# Patient Record
Sex: FEMALE | Race: WHITE | NOT HISPANIC OR LATINO | Employment: FULL TIME | URBAN - METROPOLITAN AREA
[De-identification: names, ages, dates, MRNs, and addresses within clinical notes are randomized per-mention and may not be internally consistent; named-entity substitution may affect disease eponyms.]

---

## 2019-01-15 ENCOUNTER — OFFICE VISIT (OUTPATIENT)
Dept: FAMILY MEDICINE CLINIC | Facility: CLINIC | Age: 49
End: 2019-01-15

## 2019-01-15 VITALS
OXYGEN SATURATION: 98 % | BODY MASS INDEX: 21.22 KG/M2 | TEMPERATURE: 97.9 F | HEIGHT: 68 IN | DIASTOLIC BLOOD PRESSURE: 74 MMHG | HEART RATE: 58 BPM | WEIGHT: 140 LBS | SYSTOLIC BLOOD PRESSURE: 120 MMHG | RESPIRATION RATE: 16 BRPM

## 2019-01-15 DIAGNOSIS — R10.9 RIGHT FLANK PAIN: ICD-10-CM

## 2019-01-15 DIAGNOSIS — B02.9 HERPES ZOSTER WITHOUT COMPLICATION: Primary | ICD-10-CM

## 2019-01-15 PROCEDURE — 99214 OFFICE O/P EST MOD 30 MIN: CPT | Performed by: FAMILY MEDICINE

## 2019-01-15 RX ORDER — TRIAMCINOLONE ACETONIDE 5 MG/G
CREAM TOPICAL 3 TIMES DAILY
Qty: 30 G | Refills: 0 | Status: SHIPPED | OUTPATIENT
Start: 2019-01-15

## 2019-01-15 RX ORDER — PREDNISONE 50 MG/1
50 TABLET ORAL DAILY
Qty: 5 TABLET | Refills: 0 | Status: SHIPPED | OUTPATIENT
Start: 2019-01-15 | End: 2019-01-20

## 2019-01-15 RX ORDER — VALACYCLOVIR HYDROCHLORIDE 500 MG/1
500 TABLET, FILM COATED ORAL 2 TIMES DAILY
Qty: 20 TABLET | Refills: 0 | Status: CANCELLED | OUTPATIENT
Start: 2019-01-15 | End: 2019-01-25

## 2019-01-15 RX ORDER — VALACYCLOVIR HYDROCHLORIDE 1 G/1
1000 TABLET, FILM COATED ORAL 2 TIMES DAILY
Qty: 21 TABLET | Refills: 0 | Status: SHIPPED | OUTPATIENT
Start: 2019-01-15 | End: 2019-01-22

## 2019-01-15 NOTE — PROGRESS NOTES
Assessment/Plan:     Diagnoses and all orders for this visit:    Herpes zoster without complication  Comments: Will prescribe valtrex and prednisone  Continue to monitor and follow up with any new or worsening symptoms  Orders:  -     predniSONE 50 mg tablet; Take 1 tablet (50 mg total) by mouth daily for 5 days  -     valACYclovir (VALTREX) 1,000 mg tablet; Take 1 tablet (1,000 mg total) by mouth 2 (two) times a day for 7 days  -     triamcinolone (KENALOG) 0 5 % cream; Apply topically 3 (three) times a day    Right flank pain  Comments:  She was told most probably secondary to shingles  Was given a prednisone and was told to take Tylenol  Back in 1 month I will consider Neurontin if it continue    Other orders  -     Cancel: valACYclovir (VALTREX) 500 mg tablet; Take 1 tablet (500 mg total) by mouth 2 (two) times a day for 10 days          There are no Patient Instructions on file for this visit  Return in about 1 month (around 2/15/2019) for Annual physical     Subjective:      Patient ID: Yun Bolaños is a 53 y o  female  Chief Complaint   Patient presents with    Rash     on waist       HPI    Patient presents with CC of a rash x two weeks on the right side of her navel  She admits to some fatigue and headaches as well  She states the rash was painful but has been getting better  Denies fever, chills, SOB, chest pain, GI symptoms  The following portions of the patient's history were reviewed and updated as appropriate: allergies, current medications, past family history, past medical history, past social history, past surgical history and problem list     Review of Systems   Constitutional: Positive for fatigue  Negative for chills and fever  HENT: Negative for trouble swallowing  Eyes: Negative for visual disturbance  Respiratory: Negative for cough and shortness of breath  Cardiovascular: Negative for chest pain, palpitations and leg swelling     Gastrointestinal: Negative for abdominal pain, constipation and diarrhea  Endocrine: Negative for cold intolerance and heat intolerance  Genitourinary: Negative for difficulty urinating and dysuria  Musculoskeletal: Negative for gait problem  Skin: Positive for rash  Neurological: Positive for headaches  Negative for dizziness, tremors and seizures  Hematological: Negative for adenopathy  Psychiatric/Behavioral: Negative for behavioral problems  Current Outpatient Prescriptions   Medication Sig Dispense Refill    predniSONE 50 mg tablet Take 1 tablet (50 mg total) by mouth daily for 5 days 5 tablet 0    triamcinolone (KENALOG) 0 5 % cream Apply topically 3 (three) times a day 30 g 0    valACYclovir (VALTREX) 1,000 mg tablet Take 1 tablet (1,000 mg total) by mouth 2 (two) times a day for 7 days 21 tablet 0     No current facility-administered medications for this visit  Objective:    /74 (BP Location: Left arm, Patient Position: Sitting, Cuff Size: Standard)   Pulse 58   Temp 97 9 °F (36 6 °C) (Tympanic)   Resp 16   Ht 5' 7 5" (1 715 m)   Wt 63 5 kg (140 lb)   SpO2 98%   BMI 21 60 kg/m²        Physical Exam   Constitutional: She is oriented to person, place, and time  She appears well-developed and well-nourished  HENT:   Head: Normocephalic and atraumatic  Eyes: Pupils are equal, round, and reactive to light  EOM are normal    Neck: Normal range of motion  Neck supple  Cardiovascular: Normal rate, regular rhythm and normal heart sounds  Pulmonary/Chest: Effort normal and breath sounds normal    Abdominal: Soft  Bowel sounds are normal    Musculoskeletal: Normal range of motion  She exhibits no edema  Lymphadenopathy:     She has no cervical adenopathy  Neurological: She is alert and oriented to person, place, and time  No cranial nerve deficit  Skin: Skin is warm  Rash noted  Psychiatric: She has a normal mood and affect  Nursing note and vitals reviewed               Wassim Meme Osuna MD

## 2019-02-11 ENCOUNTER — TRANSCRIBE ORDERS (OUTPATIENT)
Dept: ADMINISTRATIVE | Facility: HOSPITAL | Age: 49
End: 2019-02-11

## 2019-02-11 ENCOUNTER — TELEPHONE (OUTPATIENT)
Dept: FAMILY MEDICINE CLINIC | Facility: CLINIC | Age: 49
End: 2019-02-11

## 2019-02-11 DIAGNOSIS — R53.83 FATIGUE, UNSPECIFIED TYPE: ICD-10-CM

## 2019-02-11 DIAGNOSIS — R51.9 GENERALIZED HEADACHES: ICD-10-CM

## 2019-02-11 DIAGNOSIS — E55.9 VITAMIN D INSUFFICIENCY: ICD-10-CM

## 2019-02-11 DIAGNOSIS — E78.49 OTHER HYPERLIPIDEMIA: Primary | ICD-10-CM

## 2019-02-11 DIAGNOSIS — R10.9 RIGHT FLANK PAIN: ICD-10-CM

## 2019-02-11 DIAGNOSIS — B02.9 HERPES ZOSTER WITHOUT COMPLICATION: Primary | ICD-10-CM

## 2019-02-12 ENCOUNTER — APPOINTMENT (OUTPATIENT)
Dept: LAB | Facility: IMAGING CENTER | Age: 49
End: 2019-02-12

## 2019-02-12 ENCOUNTER — APPOINTMENT (OUTPATIENT)
Dept: LAB | Facility: IMAGING CENTER | Age: 49
End: 2019-02-12
Payer: COMMERCIAL

## 2019-02-12 DIAGNOSIS — R53.83 FATIGUE, UNSPECIFIED TYPE: ICD-10-CM

## 2019-02-12 DIAGNOSIS — B02.9 HERPES ZOSTER WITHOUT COMPLICATION: ICD-10-CM

## 2019-02-12 DIAGNOSIS — R51.9 GENERALIZED HEADACHES: ICD-10-CM

## 2019-02-12 DIAGNOSIS — E78.49 OTHER HYPERLIPIDEMIA: ICD-10-CM

## 2019-02-12 DIAGNOSIS — E55.9 VITAMIN D INSUFFICIENCY: ICD-10-CM

## 2019-02-12 DIAGNOSIS — R10.9 RIGHT FLANK PAIN: ICD-10-CM

## 2019-02-12 LAB
25(OH)D3 SERPL-MCNC: 34.2 NG/ML (ref 30–100)
ALBUMIN SERPL BCP-MCNC: 4.3 G/DL (ref 3.5–5)
ALP SERPL-CCNC: 65 U/L (ref 46–116)
ALT SERPL W P-5'-P-CCNC: 19 U/L (ref 12–78)
ANION GAP SERPL CALCULATED.3IONS-SCNC: 4 MMOL/L (ref 4–13)
AST SERPL W P-5'-P-CCNC: 20 U/L (ref 5–45)
BASOPHILS # BLD AUTO: 0.03 THOUSANDS/ΜL (ref 0–0.1)
BASOPHILS NFR BLD AUTO: 1 % (ref 0–1)
BILIRUB SERPL-MCNC: 0.78 MG/DL (ref 0.2–1)
BUN SERPL-MCNC: 15 MG/DL (ref 5–25)
CALCIUM SERPL-MCNC: 8.9 MG/DL (ref 8.3–10.1)
CHLORIDE SERPL-SCNC: 103 MMOL/L (ref 100–108)
CHOLEST SERPL-MCNC: 190 MG/DL (ref 50–200)
CO2 SERPL-SCNC: 29 MMOL/L (ref 21–32)
CREAT SERPL-MCNC: 0.93 MG/DL (ref 0.6–1.3)
EOSINOPHIL # BLD AUTO: 0.26 THOUSAND/ΜL (ref 0–0.61)
EOSINOPHIL NFR BLD AUTO: 6 % (ref 0–6)
ERYTHROCYTE [DISTWIDTH] IN BLOOD BY AUTOMATED COUNT: 13.2 % (ref 11.6–15.1)
GFR SERPL CREATININE-BSD FRML MDRD: 73 ML/MIN/1.73SQ M
GLUCOSE P FAST SERPL-MCNC: 78 MG/DL (ref 65–99)
HCT VFR BLD AUTO: 44.4 % (ref 34.8–46.1)
HDLC SERPL-MCNC: 98 MG/DL (ref 40–60)
HGB BLD-MCNC: 14.6 G/DL (ref 11.5–15.4)
IMM GRANULOCYTES # BLD AUTO: 0.01 THOUSAND/UL (ref 0–0.2)
IMM GRANULOCYTES NFR BLD AUTO: 0 % (ref 0–2)
LDLC SERPL CALC-MCNC: 83 MG/DL (ref 0–100)
LYMPHOCYTES # BLD AUTO: 1.5 THOUSANDS/ΜL (ref 0.6–4.47)
LYMPHOCYTES NFR BLD AUTO: 34 % (ref 14–44)
MCH RBC QN AUTO: 31.7 PG (ref 26.8–34.3)
MCHC RBC AUTO-ENTMCNC: 32.9 G/DL (ref 31.4–37.4)
MCV RBC AUTO: 97 FL (ref 82–98)
MONOCYTES # BLD AUTO: 0.34 THOUSAND/ΜL (ref 0.17–1.22)
MONOCYTES NFR BLD AUTO: 8 % (ref 4–12)
NEUTROPHILS # BLD AUTO: 2.31 THOUSANDS/ΜL (ref 1.85–7.62)
NEUTS SEG NFR BLD AUTO: 51 % (ref 43–75)
NRBC BLD AUTO-RTO: 0 /100 WBCS
PLATELET # BLD AUTO: 317 THOUSANDS/UL (ref 149–390)
PMV BLD AUTO: 10.4 FL (ref 8.9–12.7)
POTASSIUM SERPL-SCNC: 4.2 MMOL/L (ref 3.5–5.3)
PROT SERPL-MCNC: 8.1 G/DL (ref 6.4–8.2)
RBC # BLD AUTO: 4.6 MILLION/UL (ref 3.81–5.12)
SODIUM SERPL-SCNC: 136 MMOL/L (ref 136–145)
TRIGL SERPL-MCNC: 46 MG/DL
TSH SERPL DL<=0.05 MIU/L-ACNC: 1.18 UIU/ML (ref 0.36–3.74)
WBC # BLD AUTO: 4.45 THOUSAND/UL (ref 4.31–10.16)

## 2019-02-12 PROCEDURE — 82306 VITAMIN D 25 HYDROXY: CPT

## 2019-02-12 PROCEDURE — 85025 COMPLETE CBC W/AUTO DIFF WBC: CPT

## 2019-02-12 PROCEDURE — 80061 LIPID PANEL: CPT

## 2019-02-12 PROCEDURE — 36415 COLL VENOUS BLD VENIPUNCTURE: CPT

## 2019-02-12 PROCEDURE — 84443 ASSAY THYROID STIM HORMONE: CPT

## 2019-02-12 PROCEDURE — 80053 COMPREHEN METABOLIC PANEL: CPT

## 2019-02-14 ENCOUNTER — OFFICE VISIT (OUTPATIENT)
Dept: FAMILY MEDICINE CLINIC | Facility: CLINIC | Age: 49
End: 2019-02-14

## 2019-02-14 VITALS
SYSTOLIC BLOOD PRESSURE: 108 MMHG | OXYGEN SATURATION: 99 % | RESPIRATION RATE: 16 BRPM | HEIGHT: 68 IN | BODY MASS INDEX: 21.37 KG/M2 | WEIGHT: 141 LBS | HEART RATE: 57 BPM | DIASTOLIC BLOOD PRESSURE: 68 MMHG | TEMPERATURE: 98 F

## 2019-02-14 DIAGNOSIS — Z23 ENCOUNTER FOR IMMUNIZATION: ICD-10-CM

## 2019-02-14 DIAGNOSIS — Z00.00 HEALTHCARE MAINTENANCE: Primary | ICD-10-CM

## 2019-02-14 PROCEDURE — 99396 PREV VISIT EST AGE 40-64: CPT | Performed by: FAMILY MEDICINE

## 2019-02-14 PROCEDURE — 90471 IMMUNIZATION ADMIN: CPT

## 2019-02-14 PROCEDURE — 90715 TDAP VACCINE 7 YRS/> IM: CPT

## 2019-02-14 NOTE — PROGRESS NOTES
Assessment/Plan:     Diagnoses and all orders for this visit:    Healthcare maintenance  Comments: It was discussed about immunizations, diet, exercise and safety measures  Encounter for immunization  -     TDAP VACCINE GREATER THAN OR EQUAL TO 6YO IM          There are no Patient Instructions on file for this visit  Return in about 1 year (around 2/14/2020)  Subjective:      Patient ID: Osvaldo Andrews is a 53 y o  female  Chief Complaint   Patient presents with    Physical Exam       Has taken recent acyclovir as directed, without complications  Both rash and burning sensation has gone away  Regularly exercises, and states her diet is well balanced  States her last GYN visit was 5 years ago, and will have appointment soon  No significant pap findings in her hx  Denies: chest pain, sob, N/V/D, abdominal, GI and     Positive: occasional bloating, some associated diarrhea  Period: "monthly" irregular sometimes heavy sometimes light  States it fluctuates with her exercise routine  The following portions of the patient's history were reviewed and updated as appropriate: allergies, current medications, past family history, past medical history, past social history, past surgical history and problem list     Review of Systems   Constitutional: Negative for chills and fever  HENT: Negative for trouble swallowing  Eyes: Negative for visual disturbance  Respiratory: Negative for cough and shortness of breath  Cardiovascular: Negative for chest pain, palpitations and leg swelling  Gastrointestinal: Negative for abdominal pain, constipation and diarrhea  Endocrine: Negative for cold intolerance and heat intolerance  Genitourinary: Negative for difficulty urinating and dysuria  Musculoskeletal: Negative for gait problem  Skin: Negative for rash  Neurological: Negative for dizziness, tremors, seizures and headaches  Hematological: Negative for adenopathy  Psychiatric/Behavioral: Negative for behavioral problems  Current Outpatient Medications   Medication Sig Dispense Refill    triamcinolone (KENALOG) 0 5 % cream Apply topically 3 (three) times a day (Patient not taking: Reported on 2/14/2019) 30 g 0    valACYclovir (VALTREX) 1,000 mg tablet Take 1 tablet (1,000 mg total) by mouth 2 (two) times a day for 7 days 21 tablet 0     No current facility-administered medications for this visit  Objective:    /68 (BP Location: Left arm, Patient Position: Sitting, Cuff Size: Standard)   Pulse 57   Temp 98 °F (36 7 °C) (Tympanic)   Resp 16   Ht 5' 7 5" (1 715 m)   Wt 64 kg (141 lb)   SpO2 99%   BMI 21 76 kg/m²        Physical Exam   Constitutional: She is oriented to person, place, and time  She appears well-developed and well-nourished  HENT:   Head: Normocephalic and atraumatic  Eyes: Pupils are equal, round, and reactive to light  EOM are normal    Neck: Normal range of motion  Neck supple  Cardiovascular: Normal rate, regular rhythm and normal heart sounds  Pulmonary/Chest: Effort normal and breath sounds normal    Abdominal: Soft  Bowel sounds are normal    Musculoskeletal: Normal range of motion  She exhibits no edema  Lymphadenopathy:     She has no cervical adenopathy  Neurological: She is alert and oriented to person, place, and time  No cranial nerve deficit  Skin: Skin is warm  Psychiatric: She has a normal mood and affect  Nursing note and vitals reviewed               Jamia Hinds MD

## 2019-11-20 ENCOUNTER — TELEPHONE (OUTPATIENT)
Dept: FAMILY MEDICINE CLINIC | Facility: CLINIC | Age: 49
End: 2019-11-20

## 2019-11-20 NOTE — TELEPHONE ENCOUNTER
Pt called to see if she needs any vaccines because she is doing an internship at [employer] and also if flu vaccine is covered by insurance  I informed her that we are unable to know if insurance will cover vaccine but between the message left and my return call she called insurance and flu vaccine was covered  I scheduled her for flu vaccine on 11/22 and she will bring the form that needs to be completed for internship as I mentioned that when we don't have previous immunization records titers could be ordered if accepted

## 2019-11-21 ENCOUNTER — CLINICAL SUPPORT (OUTPATIENT)
Dept: FAMILY MEDICINE CLINIC | Facility: CLINIC | Age: 49
End: 2019-11-21
Payer: COMMERCIAL

## 2019-11-21 ENCOUNTER — APPOINTMENT (OUTPATIENT)
Dept: LAB | Facility: IMAGING CENTER | Age: 49
End: 2019-11-21
Payer: COMMERCIAL

## 2019-11-21 ENCOUNTER — TRANSCRIBE ORDERS (OUTPATIENT)
Dept: ADMINISTRATIVE | Facility: HOSPITAL | Age: 49
End: 2019-11-21

## 2019-11-21 DIAGNOSIS — Z23 NEED FOR INFLUENZA VACCINATION: Primary | ICD-10-CM

## 2019-11-21 DIAGNOSIS — Z13.0 SCREENING FOR BLOOD DISEASE: ICD-10-CM

## 2019-11-21 LAB
HBV SURFACE AB SER-ACNC: <3.1 MIU/ML
RUBV IGG SERPL IA-ACNC: 26.2 IU/ML

## 2019-11-21 PROCEDURE — 90471 IMMUNIZATION ADMIN: CPT

## 2019-11-21 PROCEDURE — 90686 IIV4 VACC NO PRSV 0.5 ML IM: CPT

## 2019-11-21 PROCEDURE — 86787 VARICELLA-ZOSTER ANTIBODY: CPT

## 2019-11-21 PROCEDURE — 36415 COLL VENOUS BLD VENIPUNCTURE: CPT

## 2019-11-21 PROCEDURE — 86706 HEP B SURFACE ANTIBODY: CPT

## 2019-11-21 PROCEDURE — 86762 RUBELLA ANTIBODY: CPT

## 2019-11-21 PROCEDURE — 86765 RUBEOLA ANTIBODY: CPT

## 2019-11-21 PROCEDURE — 86735 MUMPS ANTIBODY: CPT

## 2019-11-21 NOTE — PROGRESS NOTES
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

## 2019-11-25 LAB — VZV IGG SER IA-ACNC: NORMAL

## 2019-11-26 LAB
MEV IGG SER QL: NORMAL
MUV IGG SER QL: NORMAL

## 2019-11-27 ENCOUNTER — TELEPHONE (OUTPATIENT)
Dept: FAMILY MEDICINE CLINIC | Facility: CLINIC | Age: 49
End: 2019-11-27

## 2019-11-27 NOTE — TELEPHONE ENCOUNTER
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  Thank you

## 2019-11-29 NOTE — TELEPHONE ENCOUNTER
I spoke with Dr Garcia Later and she had immunity to mmr and varivax but needs to repeat hep b series   I did notify pt and she is scheduled for hep b #1 on 11/2

## 2019-12-03 ENCOUNTER — CLINICAL SUPPORT (OUTPATIENT)
Dept: FAMILY MEDICINE CLINIC | Facility: CLINIC | Age: 49
End: 2019-12-03
Payer: COMMERCIAL

## 2019-12-03 DIAGNOSIS — Z11.1 ENCOUNTER FOR PPD TEST: ICD-10-CM

## 2019-12-03 DIAGNOSIS — Z23 ENCOUNTER FOR IMMUNIZATION: Primary | ICD-10-CM

## 2019-12-03 PROCEDURE — 90746 HEPB VACCINE 3 DOSE ADULT IM: CPT

## 2019-12-03 PROCEDURE — 90471 IMMUNIZATION ADMIN: CPT

## 2019-12-03 PROCEDURE — 86580 TB INTRADERMAL TEST: CPT

## 2019-12-05 ENCOUNTER — CLINICAL SUPPORT (OUTPATIENT)
Dept: FAMILY MEDICINE CLINIC | Facility: CLINIC | Age: 49
End: 2019-12-05

## 2019-12-05 DIAGNOSIS — Z11.1 SCREENING-PULMONARY TB: Primary | ICD-10-CM

## 2019-12-05 LAB
INDURATION: 0 MM
TB SKIN TEST: NEGATIVE

## 2019-12-05 NOTE — PROGRESS NOTES
Pt here for  PPD read  Negative, 0mm (left arm)  Pt given copy on TB result, immunizations and titer results     Pt will sched 2nd TB test

## 2019-12-10 ENCOUNTER — CLINICAL SUPPORT (OUTPATIENT)
Dept: FAMILY MEDICINE CLINIC | Facility: CLINIC | Age: 49
End: 2019-12-10
Payer: COMMERCIAL

## 2019-12-10 DIAGNOSIS — Z11.1 SCREENING-PULMONARY TB: Primary | ICD-10-CM

## 2019-12-10 PROCEDURE — 86580 TB INTRADERMAL TEST: CPT

## 2019-12-12 ENCOUNTER — CLINICAL SUPPORT (OUTPATIENT)
Dept: FAMILY MEDICINE CLINIC | Facility: CLINIC | Age: 49
End: 2019-12-12

## 2019-12-12 DIAGNOSIS — Z11.1 SCREENING-PULMONARY TB: Primary | ICD-10-CM

## 2019-12-12 LAB
INDURATION: 0 MM
TB SKIN TEST: NEGATIVE

## 2020-01-06 ENCOUNTER — CLINICAL SUPPORT (OUTPATIENT)
Dept: FAMILY MEDICINE CLINIC | Facility: CLINIC | Age: 50
End: 2020-01-06
Payer: COMMERCIAL

## 2020-01-06 DIAGNOSIS — Z23 ENCOUNTER FOR IMMUNIZATION: Primary | ICD-10-CM

## 2020-01-06 PROCEDURE — 90746 HEPB VACCINE 3 DOSE ADULT IM: CPT

## 2020-01-06 PROCEDURE — 90471 IMMUNIZATION ADMIN: CPT

## 2020-03-06 ENCOUNTER — TELEPHONE (OUTPATIENT)
Dept: FAMILY MEDICINE CLINIC | Facility: CLINIC | Age: 50
End: 2020-03-06

## 2020-06-05 ENCOUNTER — CLINICAL SUPPORT (OUTPATIENT)
Dept: FAMILY MEDICINE CLINIC | Facility: CLINIC | Age: 50
End: 2020-06-05
Payer: COMMERCIAL

## 2020-06-05 DIAGNOSIS — Z23 IMMUNIZATION DUE: Primary | ICD-10-CM

## 2020-06-05 PROCEDURE — 90746 HEPB VACCINE 3 DOSE ADULT IM: CPT

## 2020-06-05 PROCEDURE — 90471 IMMUNIZATION ADMIN: CPT

## 2020-08-03 ENCOUNTER — TELEPHONE (OUTPATIENT)
Dept: FAMILY MEDICINE CLINIC | Facility: CLINIC | Age: 50
End: 2020-08-03

## 2020-08-03 DIAGNOSIS — Z13.0 SCREENING FOR BLOOD DISEASE: Primary | ICD-10-CM

## 2020-08-03 NOTE — TELEPHONE ENCOUNTER
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

## 2020-08-03 NOTE — TELEPHONE ENCOUNTER
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

## 2020-08-05 ENCOUNTER — APPOINTMENT (OUTPATIENT)
Dept: LAB | Facility: IMAGING CENTER | Age: 50
End: 2020-08-05
Payer: COMMERCIAL

## 2020-08-05 DIAGNOSIS — Z13.0 SCREENING FOR BLOOD DISEASE: ICD-10-CM

## 2020-08-05 PROCEDURE — 86706 HEP B SURFACE ANTIBODY: CPT

## 2020-08-05 PROCEDURE — 36415 COLL VENOUS BLD VENIPUNCTURE: CPT

## 2020-08-07 ENCOUNTER — TELEPHONE (OUTPATIENT)
Dept: FAMILY MEDICINE CLINIC | Facility: CLINIC | Age: 50
End: 2020-08-07

## 2020-08-07 LAB — HBV SURFACE AB SER-ACNC: >1000 MIU/ML

## 2020-08-07 NOTE — TELEPHONE ENCOUNTER
L/m for pt , returning her call  Per Dr Massiel Panchal  She has Hep B antibodies and can  a copy of lab results

## 2020-08-07 NOTE — TELEPHONE ENCOUNTER
Pt called that she needs the Hep B antibody test results and a copy  When we call her she will be bring down a form that needs to be signed by a nurse about our contact info  Please call pt with those results

## 2020-08-10 ENCOUNTER — TELEPHONE (OUTPATIENT)
Dept: FAMILY MEDICINE CLINIC | Facility: CLINIC | Age: 50
End: 2020-08-10

## 2020-08-11 ENCOUNTER — OFFICE VISIT (OUTPATIENT)
Dept: FAMILY MEDICINE CLINIC | Facility: CLINIC | Age: 50
End: 2020-08-11
Payer: COMMERCIAL

## 2020-08-11 VITALS
BODY MASS INDEX: 20.78 KG/M2 | RESPIRATION RATE: 16 BRPM | DIASTOLIC BLOOD PRESSURE: 70 MMHG | WEIGHT: 137.13 LBS | HEIGHT: 68 IN | SYSTOLIC BLOOD PRESSURE: 110 MMHG | TEMPERATURE: 98.2 F | HEART RATE: 73 BPM | OXYGEN SATURATION: 99 %

## 2020-08-11 DIAGNOSIS — Z12.31 ENCOUNTER FOR SCREENING MAMMOGRAM FOR MALIGNANT NEOPLASM OF BREAST: ICD-10-CM

## 2020-08-11 DIAGNOSIS — Z00.00 HEALTHCARE MAINTENANCE: Primary | ICD-10-CM

## 2020-08-11 PROCEDURE — 99396 PREV VISIT EST AGE 40-64: CPT | Performed by: FAMILY MEDICINE

## 2020-08-11 NOTE — PROGRESS NOTES
Assessment/Plan:  Healthcare maintenance  Was discussed about immunizations, diet, exercise and safety measures  Diagnoses and all orders for this visit:    Healthcare maintenance    Encounter for screening mammogram for malignant neoplasm of breast  -     Mammo screening bilateral w cad; Future          There are no Patient Instructions on file for this visit  Return in about 1 year (around 8/11/2021)  Subjective:      Patient ID: Ziggy Bowser is a 51 y o  female  Chief Complaint   Patient presents with    Physical Exam       She is here today for wellness exam   She brought form with her to complete  She denies any complaint today  The following portions of the patient's history were reviewed and updated as appropriate: allergies, current medications, past family history, past medical history, past social history, past surgical history and problem list     Review of Systems   Constitutional: Negative for chills and fever  HENT: Negative for trouble swallowing  Eyes: Negative for visual disturbance  Respiratory: Negative for cough and shortness of breath  Cardiovascular: Negative for chest pain, palpitations and leg swelling  Gastrointestinal: Negative for abdominal pain, constipation and diarrhea  Endocrine: Negative for cold intolerance and heat intolerance  Genitourinary: Negative for difficulty urinating and dysuria  Musculoskeletal: Negative for gait problem  Skin: Negative for rash  Neurological: Negative for dizziness, tremors, seizures and headaches  Hematological: Negative for adenopathy  Psychiatric/Behavioral: Negative for behavioral problems           Current Outpatient Medications   Medication Sig Dispense Refill    triamcinolone (KENALOG) 0 5 % cream Apply topically 3 (three) times a day (Patient not taking: Reported on 2/14/2019) 30 g 0    valACYclovir (VALTREX) 1,000 mg tablet Take 1 tablet (1,000 mg total) by mouth 2 (two) times a day for 7 days 21 tablet 0     No current facility-administered medications for this visit  Objective:    /70 (BP Location: Left arm, Patient Position: Sitting, Cuff Size: Adult)   Pulse 73   Temp 98 2 °F (36 8 °C) (Tympanic)   Resp 16   Ht 5' 7 5" (1 715 m)   Wt 62 2 kg (137 lb 2 oz)   SpO2 99%   BMI 21 16 kg/m²        Physical Exam  Vitals signs and nursing note reviewed  Constitutional:       Appearance: She is well-developed  HENT:      Head: Normocephalic and atraumatic  Eyes:      Pupils: Pupils are equal, round, and reactive to light  Neck:      Musculoskeletal: Normal range of motion and neck supple  Cardiovascular:      Rate and Rhythm: Normal rate and regular rhythm  Heart sounds: Normal heart sounds  Pulmonary:      Effort: Pulmonary effort is normal       Breath sounds: Normal breath sounds  Abdominal:      General: Bowel sounds are normal       Palpations: Abdomen is soft  Musculoskeletal: Normal range of motion  Lymphadenopathy:      Cervical: No cervical adenopathy  Skin:     General: Skin is warm  Neurological:      Mental Status: She is alert and oriented to person, place, and time  Cranial Nerves: No cranial nerve deficit                  Sunday Moss MD

## 2020-09-17 ENCOUNTER — TELEPHONE (OUTPATIENT)
Dept: FAMILY MEDICINE CLINIC | Facility: CLINIC | Age: 50
End: 2020-09-17

## 2021-04-08 DIAGNOSIS — Z23 ENCOUNTER FOR IMMUNIZATION: ICD-10-CM

## 2021-04-13 ENCOUNTER — IMMUNIZATIONS (OUTPATIENT)
Dept: FAMILY MEDICINE CLINIC | Facility: HOSPITAL | Age: 51
End: 2021-04-13

## 2021-04-13 DIAGNOSIS — Z23 ENCOUNTER FOR IMMUNIZATION: ICD-10-CM

## 2021-04-13 PROCEDURE — 0031A: CPT

## 2021-04-13 PROCEDURE — 91303: CPT

## 2021-12-14 ENCOUNTER — IMMUNIZATIONS (OUTPATIENT)
Dept: FAMILY MEDICINE CLINIC | Facility: HOSPITAL | Age: 51
End: 2021-12-14

## 2021-12-14 DIAGNOSIS — Z23 ENCOUNTER FOR IMMUNIZATION: Primary | ICD-10-CM

## 2021-12-14 PROCEDURE — 0064A COVID-19 MODERNA VACC 0.25 ML BOOSTER: CPT

## 2021-12-14 PROCEDURE — 91306 COVID-19 MODERNA VACC 0.25 ML BOOSTER: CPT

## 2022-09-12 ENCOUNTER — OFFICE VISIT (OUTPATIENT)
Dept: FAMILY MEDICINE CLINIC | Facility: CLINIC | Age: 52
End: 2022-09-12
Payer: COMMERCIAL

## 2022-09-12 VITALS
SYSTOLIC BLOOD PRESSURE: 124 MMHG | WEIGHT: 139.4 LBS | BODY MASS INDEX: 21.13 KG/M2 | HEART RATE: 48 BPM | DIASTOLIC BLOOD PRESSURE: 66 MMHG | RESPIRATION RATE: 16 BRPM | OXYGEN SATURATION: 99 % | HEIGHT: 68 IN | TEMPERATURE: 97.6 F

## 2022-09-12 DIAGNOSIS — Z00.00 HEALTHCARE MAINTENANCE: ICD-10-CM

## 2022-09-12 DIAGNOSIS — Z12.11 COLON CANCER SCREENING: Primary | ICD-10-CM

## 2022-09-12 PROCEDURE — 3725F SCREEN DEPRESSION PERFORMED: CPT | Performed by: FAMILY MEDICINE

## 2022-09-12 PROCEDURE — 99396 PREV VISIT EST AGE 40-64: CPT | Performed by: FAMILY MEDICINE

## 2022-09-12 NOTE — PROGRESS NOTES
Assessment/Plan:  Healthcare maintenance  It was discussed about immunizations, diet, exercise and safety measures  She was given referral to get her colonoscopy  Order mammogram scheduled tomorrow  Was discussed with patient she is to see her gynecologist        Diagnoses and all orders for this visit:    Colon cancer screening  -     Ambulatory Referral to General Surgery; Future  -     Ambulatory Referral to General Surgery; Future    Healthcare maintenance  -     CBC and differential; Future  -     Comprehensive metabolic panel; Future  -     Lipid Panel with Direct LDL reflex; Future  -     TSH, 3rd generation with Free T4 reflex; Future        There are no Patient Instructions on file for this visit  Return in about 1 year (around 9/12/2023)  Subjective:      Patient ID: Bello Patino is a 49 y o  female  Chief Complaint   Patient presents with    Physical Exam       Wellness exam   She denies any complaint  She did not get her colonoscopy yet  Not been seen by her gynecologist for a while  The following portions of the patient's history were reviewed and updated as appropriate: allergies, current medications, past family history, past medical history, past social history, past surgical history and problem list     Review of Systems   Constitutional: Negative for chills and fever  HENT: Negative for trouble swallowing  Eyes: Negative for visual disturbance  Respiratory: Negative for cough and shortness of breath  Cardiovascular: Negative for chest pain, palpitations and leg swelling  Gastrointestinal: Negative for abdominal pain, constipation and diarrhea  Endocrine: Negative for cold intolerance and heat intolerance  Genitourinary: Negative for difficulty urinating and dysuria  Musculoskeletal: Negative for gait problem  Skin: Negative for rash  Neurological: Negative for dizziness, tremors, seizures and headaches  Hematological: Negative for adenopathy  Psychiatric/Behavioral: Negative for behavioral problems  No current outpatient medications on file  No current facility-administered medications for this visit  Objective:    /66 (BP Location: Left arm, Patient Position: Sitting, Cuff Size: Standard)   Pulse (!) 48   Temp 97 6 °F (36 4 °C) (Tympanic)   Resp 16   Ht 5' 8" (1 727 m)   Wt 63 2 kg (139 lb 6 4 oz)   SpO2 99%   BMI 21 20 kg/m²        Physical Exam  Vitals and nursing note reviewed  Constitutional:       Appearance: She is well-developed  HENT:      Head: Normocephalic and atraumatic  Eyes:      Pupils: Pupils are equal, round, and reactive to light  Cardiovascular:      Rate and Rhythm: Normal rate and regular rhythm  Heart sounds: Normal heart sounds  Pulmonary:      Effort: Pulmonary effort is normal       Breath sounds: Normal breath sounds  Abdominal:      General: Bowel sounds are normal       Palpations: Abdomen is soft  Musculoskeletal:         General: Normal range of motion  Cervical back: Normal range of motion and neck supple  Lymphadenopathy:      Cervical: No cervical adenopathy  Skin:     General: Skin is warm  Neurological:      Mental Status: She is alert and oriented to person, place, and time  Cranial Nerves: No cranial nerve deficit                  Ayaan Clifton MD

## 2022-09-12 NOTE — ASSESSMENT & PLAN NOTE
It was discussed about immunizations, diet, exercise and safety measures  She was given referral to get her colonoscopy  Order mammogram scheduled tomorrow    Was discussed with patient she is to see her gynecologist

## 2022-09-13 ENCOUNTER — HOSPITAL ENCOUNTER (OUTPATIENT)
Dept: RADIOLOGY | Facility: IMAGING CENTER | Age: 52
Discharge: HOME | End: 2022-09-13
Payer: COMMERCIAL

## 2022-09-13 VITALS — WEIGHT: 139 LBS | HEIGHT: 68 IN | BODY MASS INDEX: 21.07 KG/M2

## 2022-09-13 DIAGNOSIS — Z12.31 ENCOUNTER FOR SCREENING MAMMOGRAM FOR MALIGNANT NEOPLASM OF BREAST: ICD-10-CM

## 2022-09-13 PROCEDURE — 77063 BREAST TOMOSYNTHESIS BI: CPT

## 2022-09-13 PROCEDURE — 77067 SCR MAMMO BI INCL CAD: CPT

## 2022-10-17 ENCOUNTER — HOSPITAL ENCOUNTER (OUTPATIENT)
Dept: MAMMOGRAPHY | Facility: CLINIC | Age: 52
Discharge: HOME | End: 2022-10-17
Payer: COMMERCIAL

## 2022-10-17 ENCOUNTER — HOSPITAL ENCOUNTER (OUTPATIENT)
Dept: ULTRASOUND IMAGING | Facility: CLINIC | Age: 52
Discharge: HOME | End: 2022-10-17
Payer: COMMERCIAL

## 2022-10-17 VITALS — BODY MASS INDEX: 21.07 KG/M2 | WEIGHT: 139 LBS | HEIGHT: 68 IN

## 2022-10-17 DIAGNOSIS — R92.8 ABNORMAL SCREENING MAMMOGRAM: ICD-10-CM

## 2022-10-17 PROCEDURE — 76642 ULTRASOUND BREAST LIMITED: CPT

## 2022-10-17 PROCEDURE — 77065 DX MAMMO INCL CAD UNI: CPT

## 2022-10-17 PROCEDURE — G0279 TOMOSYNTHESIS, MAMMO: HCPCS

## 2022-11-03 ENCOUNTER — APPOINTMENT (OUTPATIENT)
Dept: LAB | Facility: IMAGING CENTER | Age: 52
End: 2022-11-03

## 2022-11-03 DIAGNOSIS — Z00.00 HEALTHCARE MAINTENANCE: ICD-10-CM

## 2022-11-03 LAB
ALBUMIN SERPL BCP-MCNC: 3.5 G/DL (ref 3.5–5)
ALP SERPL-CCNC: 83 U/L (ref 46–116)
ALT SERPL W P-5'-P-CCNC: 24 U/L (ref 12–78)
ANION GAP SERPL CALCULATED.3IONS-SCNC: 4 MMOL/L (ref 4–13)
AST SERPL W P-5'-P-CCNC: 23 U/L (ref 5–45)
BASOPHILS # BLD AUTO: 0.03 THOUSANDS/ÂΜL (ref 0–0.1)
BASOPHILS NFR BLD AUTO: 1 % (ref 0–1)
BILIRUB SERPL-MCNC: 0.59 MG/DL (ref 0.2–1)
BUN SERPL-MCNC: 16 MG/DL (ref 5–25)
CALCIUM SERPL-MCNC: 9.3 MG/DL (ref 8.3–10.1)
CHLORIDE SERPL-SCNC: 104 MMOL/L (ref 96–108)
CHOLEST SERPL-MCNC: 190 MG/DL
CO2 SERPL-SCNC: 29 MMOL/L (ref 21–32)
CREAT SERPL-MCNC: 0.9 MG/DL (ref 0.6–1.3)
EOSINOPHIL # BLD AUTO: 0.1 THOUSAND/ÂΜL (ref 0–0.61)
EOSINOPHIL NFR BLD AUTO: 3 % (ref 0–6)
ERYTHROCYTE [DISTWIDTH] IN BLOOD BY AUTOMATED COUNT: 12.9 % (ref 11.6–15.1)
GFR SERPL CREATININE-BSD FRML MDRD: 74 ML/MIN/1.73SQ M
GLUCOSE P FAST SERPL-MCNC: 86 MG/DL (ref 65–99)
HCT VFR BLD AUTO: 38.9 % (ref 34.8–46.1)
HDLC SERPL-MCNC: 97 MG/DL
HGB BLD-MCNC: 12.9 G/DL (ref 11.5–15.4)
IMM GRANULOCYTES # BLD AUTO: 0.01 THOUSAND/UL (ref 0–0.2)
IMM GRANULOCYTES NFR BLD AUTO: 0 % (ref 0–2)
LDLC SERPL CALC-MCNC: 85 MG/DL (ref 0–100)
LYMPHOCYTES # BLD AUTO: 1.08 THOUSANDS/ÂΜL (ref 0.6–4.47)
LYMPHOCYTES NFR BLD AUTO: 33 % (ref 14–44)
MCH RBC QN AUTO: 31.2 PG (ref 26.8–34.3)
MCHC RBC AUTO-ENTMCNC: 33.2 G/DL (ref 31.4–37.4)
MCV RBC AUTO: 94 FL (ref 82–98)
MONOCYTES # BLD AUTO: 0.23 THOUSAND/ÂΜL (ref 0.17–1.22)
MONOCYTES NFR BLD AUTO: 7 % (ref 4–12)
NEUTROPHILS # BLD AUTO: 1.81 THOUSANDS/ÂΜL (ref 1.85–7.62)
NEUTS SEG NFR BLD AUTO: 56 % (ref 43–75)
NRBC BLD AUTO-RTO: 0 /100 WBCS
PLATELET # BLD AUTO: 292 THOUSANDS/UL (ref 149–390)
PMV BLD AUTO: 10.3 FL (ref 8.9–12.7)
POTASSIUM SERPL-SCNC: 4 MMOL/L (ref 3.5–5.3)
PROT SERPL-MCNC: 7.4 G/DL (ref 6.4–8.4)
RBC # BLD AUTO: 4.13 MILLION/UL (ref 3.81–5.12)
SODIUM SERPL-SCNC: 137 MMOL/L (ref 135–147)
TRIGL SERPL-MCNC: 38 MG/DL
TSH SERPL DL<=0.05 MIU/L-ACNC: 1.34 UIU/ML (ref 0.45–4.5)
WBC # BLD AUTO: 3.26 THOUSAND/UL (ref 4.31–10.16)

## 2022-11-25 NOTE — PROGRESS NOTES
Assessment/Plan:   Ruben Ho is a 47 y  o female who is here for a: First Screening Colonoscopy  Plan: Colonoscopy for: Screening for Colon Cancer, Family History of Colon Cancer      Previous Colonoscopy and  Location of polyps if any:   none      Preoperative Clearance: None  Also discussed still needs gyn routine maintenance follow-up  Does not follow gyn but reports seeing midwife every couple of years  Have not had papsmear in over 20's year   ______________________________________________________    HPI:  Ruben Ho is a 47 y  o female who was referred for evaluation of    First Screening  Currently:  Symptoms include:  no abdominal pain, change in bowel habits, or black or bloody stools  Family history of colon cancer: Father and paternal grandfather had colon cancer   Father was diagnosised at 79 with stage 4 colon cancer        Anticoagulation: none    A1C: none     LABS:      Lab Results   Component Value Date    WBC 3 26 (L) 11/03/2022    HGB 12 9 11/03/2022    HCT 38 9 11/03/2022    MCV 94 11/03/2022     11/03/2022     Lab Results   Component Value Date    K 4 0 11/03/2022     11/03/2022    CO2 29 11/03/2022    BUN 16 11/03/2022    CREATININE 0 90 11/03/2022    GLUF 86 11/03/2022    CALCIUM 9 3 11/03/2022    AST 23 11/03/2022    ALT 24 11/03/2022    ALKPHOS 83 11/03/2022    EGFR 74 11/03/2022     No results found for: HGBA1C  No results found for: INR, PROTIME      No orders to display           ROS:  General ROS: negative for - chills, fatigue, fever or night sweats, weight loss  Respiratory ROS: no cough, shortness of breath, or wheezing  Cardiovascular ROS: no chest pain or dyspnea on exertion  Genito-Urinary ROS: no dysuria, trouble voiding, or hematuria  Musculoskeletal ROS: negative for - gait disturbance, joint pain or muscle pain  Neurological ROS: no TIA or stroke symptoms  GI ROS: see HPI  Skin ROS: no new rashes or lesions   Lymphatic ROS: no new adenopathy noted by pt  GYN ROS: see HPI, no new GYN history or bleeding noted  Psy ROS: no new mental or behavioral disturbances           Imaging: none  Patient Active Problem List   Diagnosis   • Herpes zoster without complication   • Right flank pain   • Healthcare maintenance   • Encounter for screening mammogram for malignant neoplasm of breast         Allergies:  Patient has no known allergies  No current outpatient medications on file  Past Medical History:   Diagnosis Date   • Herpes zoster without complication 5/27/4753       No past surgical history on file      Family History   Problem Relation Age of Onset   • Stroke Mother    • Coronary artery disease Mother    • Colon cancer Father 72   • No Known Problems Sister    • No Known Problems Sister    • No Known Problems Sister    • No Known Problems Sister    • No Known Problems Daughter    • Thyroid cancer Daughter 29   • No Known Problems Daughter    • No Known Problems Daughter    • No Known Problems Daughter    • Colon cancer Paternal Grandfather    • No Known Problems Son        Social History     Socioeconomic History   • Marital status: /Civil Union     Spouse name: Not on file   • Number of children: Not on file   • Years of education: Not on file   • Highest education level: Not on file   Occupational History   • Not on file   Tobacco Use   • Smoking status: Never   • Smokeless tobacco: Never   Vaping Use   • Vaping Use: Never used   Substance and Sexual Activity   • Alcohol use: No   • Drug use: No   • Sexual activity: Not on file   Other Topics Concern   • Not on file   Social History Narrative   • Not on file     Social Determinants of Health     Financial Resource Strain: Not on file   Food Insecurity: Not on file   Transportation Needs: Not on file   Physical Activity: Not on file   Stress: Not on file   Social Connections: Not on file   Intimate Partner Violence: Not on file   Housing Stability: Not on file       Exam: There were no vitals filed for this visit         ______________________________________________________    PHYSICAL EXAM  General Appearance:    Alert, cooperative, no distress,      Head:    Normocephalic without obvious abnormality   Eyes:    PERRL, conjunctiva/corneas clear,        Neck:   Supple, no adenopathy, no JVD   Back:     Symmetric, no spinal or CVA tenderness   Lungs:     Clear to auscultation bilaterally,    Heart:    Regular rate and rhythm, S1 and S2 normal, no murmur   Abdomen:     Non-tender in RUQ, LUQ, RLQ, LLQ, no altman's signs  No visible masses or pulsations  Extremities:   Extremities normal  No clubbing, cyanosis or edema   Psych:   Normal Affect   Neurologic:   CNII-XII intact  Strength symmetric, speech intact                 Johanna Espinal MD  Date: 11/25/2022 Time: 7:30 AM       This report has been generated by a voice recognition software system  Therefore, there may be syntax, spelling, and/or grammatical errors  Please call if you've any questions  This  encounter has been billed by a non certified Coder  Informed consent for procedure was personally discussed, reviewed, and signed by Dr Madeline Johnson  Discussion by Dr Madeline Johnson was carried out regarding risks, benefits, and alternatives with the patient  Risks include but are not limited to:  bleeding, infection, and delayed wound healing or an open wound, pulmonary embolus, leaks from bowel or bile ducts or other viscus, transfusions, death  Discussed in further detail the more common complications and their rates of occurrence   was used if necessary  Patient expressed understanding of the issues discussed and wished/consented to proceed  All questions were answered by Dr Madeline Johnson  Discussion performed between patient and the provider signing below       Signature:   Johanna Espinal MD  Date: 11/25/2022 Time: 7:30 AM Informed consent for procedure was personally discussed, reviewed, and signed by Dr Eleanor Winn  Discussion by Dr Eleanor Winn was carried out regarding risks, benefits, and alternatives with the patient  Risks include but are not limited to:  bleeding, infection, and delayed wound healing or an open wound, pulmonary embolus, leaks from bowel or bile ducts or other viscus, transfusions, death  Discussed in further detail the more common complications and their rates of occurrence   was used if necessary  Patient expressed understanding of the issues discussed and wished/consented to proceed  All questions were answered by Dr Eleanor Winn  Discussion performed between patient and the provider signing below  Signature:   Amina Whyte MD    Date: 11/25/2022 Time: 7:30 AM           Some portions of this record may have been generated with voice recognition software  There may be translation, syntax,  or grammatical errors  Occasional wrong word or "sound-a-like" substitutions may have occurred due to the inherent limitations of the voice recognition software  Read the chart carefully and recognize, using context, where substitutions may have occurred  If you have any questions, please contact the dictating provider for clarification or correction, as needed  This encounter has been coded by a non-certified coder

## 2022-11-28 ENCOUNTER — CONSULT (OUTPATIENT)
Dept: SURGERY | Facility: CLINIC | Age: 52
End: 2022-11-28

## 2022-11-28 VITALS
BODY MASS INDEX: 21.22 KG/M2 | WEIGHT: 140 LBS | HEART RATE: 60 BPM | OXYGEN SATURATION: 99 % | SYSTOLIC BLOOD PRESSURE: 120 MMHG | DIASTOLIC BLOOD PRESSURE: 76 MMHG | TEMPERATURE: 97.2 F | RESPIRATION RATE: 14 BRPM | HEIGHT: 68 IN

## 2022-11-28 DIAGNOSIS — Z12.11 ENCOUNTER FOR SCREENING COLONOSCOPY: Primary | ICD-10-CM

## 2022-12-15 NOTE — PROGRESS NOTES
Assessment/Plan:   Gela Parkinson is a 47 y  o female who is here for a: First Screening Colonoscopy  Plan: Colonoscopy for: Screening for Colon Cancer, Family History of Colon Cancer      Previous Colonoscopy and  Location of polyps if any:   none      Preoperative Clearance: None  Also discussed still needs gyn routine maintenance follow-up  Does not follow gyn but reports seeing midwife every couple of years  Have not had papsmear in over 20's year   ______________________________________________________    HPI:  Gela Parkinson is a 47 y  o female who was referred for evaluation of    First Screening  Currently:  Symptoms include:  no abdominal pain, change in bowel habits, or black or bloody stools  Family history of colon cancer: Father and paternal grandfather had colon cancer   Father was diagnosised at [address] [place] [address] with stage 4 colon cancer        Anticoagulation: none    A1C: none     LABS:      Lab Results   Component Value Date    WBC 3 26 (L) 11/03/2022    HGB 12 9 11/03/2022    HCT 38 9 11/03/2022    MCV 94 11/03/2022     11/03/2022     Lab Results   Component Value Date    K 4 0 11/03/2022     11/03/2022    CO2 29 11/03/2022    BUN 16 11/03/2022    CREATININE 0 90 11/03/2022    GLUF 86 11/03/2022    CALCIUM 9 3 11/03/2022    AST 23 11/03/2022    ALT 24 11/03/2022    ALKPHOS 83 11/03/2022    EGFR 74 11/03/2022     No results found for: HGBA1C  No results found for: INR, PROTIME      No orders to display           ROS:  General ROS: negative for - chills, fatigue, fever or night sweats, weight loss  Respiratory ROS: no cough, shortness of breath, or wheezing  Cardiovascular ROS: no chest pain or dyspnea on exertion  Genito-Urinary ROS: no dysuria, trouble voiding, or hematuria  Musculoskeletal ROS: negative for - gait disturbance, joint pain or muscle pain  Neurological ROS: no TIA or stroke symptoms  GI ROS: see HPI  Skin ROS: no new rashes or lesions   Lymphatic ROS: no new adenopathy noted by pt  GYN ROS: see HPI, no new GYN history or bleeding noted  Psy ROS: no new mental or behavioral disturbances           Imaging: none  Patient Active Problem List   Diagnosis   • Herpes zoster without complication   • Right flank pain   • Healthcare maintenance   • Encounter for screening mammogram for malignant neoplasm of breast         Allergies:  Patient has no known allergies  No current outpatient medications on file  Past Medical History:   Diagnosis Date   • Herpes zoster without complication 3/02/9823       History reviewed  No pertinent surgical history      Family History   Problem Relation Age of Onset   • Stroke Mother    • Coronary artery disease Mother    • Colon cancer Father 72   • No Known Problems Sister    • No Known Problems Sister    • No Known Problems Sister    • No Known Problems Sister    • No Known Problems Daughter    • Thyroid cancer Daughter 29   • No Known Problems Daughter    • No Known Problems Daughter    • No Known Problems Daughter    • Colon cancer Paternal Grandfather    • No Known Problems Son        Social History     Socioeconomic History   • Marital status: /Civil Union     Spouse name: None   • Number of children: None   • Years of education: None   • Highest education level: None   Occupational History   • None   Tobacco Use   • Smoking status: Former     Types: Cigarettes   • Smokeless tobacco: Never   Vaping Use   • Vaping Use: Never used   Substance and Sexual Activity   • Alcohol use: No   • Drug use: No   • Sexual activity: None   Other Topics Concern   • None   Social History Narrative   • None     Social Determinants of Health     Financial Resource Strain: Not on file   Food Insecurity: Not on file   Transportation Needs: Not on file   Physical Activity: Not on file   Stress: Not on file   Social Connections: Not on file   Intimate Partner Violence: Not on file   Housing Stability: Not on file       Exam:   Vitals: 12/20/22 0757   BP: 116/78   Pulse: (!) 49   Temp: (!) 96 5 °F (35 8 °C)           ______________________________________________________    PHYSICAL EXAM  General Appearance:    Alert, cooperative, no distress,      Head:    Normocephalic without obvious abnormality   Eyes:    PERRL, conjunctiva/corneas clear,        Neck:   Supple, no adenopathy, no JVD   Back:     Symmetric, no spinal or CVA tenderness   Lungs:     Clear to auscultation bilaterally,    Heart:    Regular rate and rhythm, S1 and S2 normal, no murmur   Abdomen:     Non-tender in RUQ, LUQ, RLQ, LLQ, no altman's signs  No visible masses or pulsations  Extremities:   Extremities normal  No clubbing, cyanosis or edema   Psych:   Normal Affect   Neurologic:   CNII-XII intact  Strength symmetric, speech intact                 Milla Garcia PA-C  Date: 12/20/2022 Time: 8:07 AM       This report has been generated by a voice recognition software system  Therefore, there may be syntax, spelling, and/or grammatical errors  Please call if you've any questions  This  encounter has been billed by a non certified Coder  Informed consent for procedure was personally discussed, reviewed, and signed by Dr Singh Guardado  Discussion by Dr Singh Guardado was carried out regarding risks, benefits, and alternatives with the patient  Risks include but are not limited to:  bleeding, infection, and delayed wound healing or an open wound, pulmonary embolus, leaks from bowel or bile ducts or other viscus, transfusions, death  Discussed in further detail the more common complications and their rates of occurrence   was used if necessary  Patient expressed understanding of the issues discussed and wished/consented to proceed  All questions were answered by Dr Singh Guardado  Discussion performed between patient and the provider signing below       Signature:   Nnamdi Solorio  Date: 12/20/2022 Time: 8:07 AM

## 2022-12-15 NOTE — H&P (VIEW-ONLY)
Assessment/Plan:   Jamal King is a 47 y  o female who is here for a: First Screening Colonoscopy  Plan: Colonoscopy for: Screening for Colon Cancer, Family History of Colon Cancer      Previous Colonoscopy and  Location of polyps if any:   none      Preoperative Clearance: None  Also discussed still needs gyn routine maintenance follow-up  Does not follow gyn but reports seeing midwife every couple of years  Have not had papsmear in over 20's year   ______________________________________________________    HPI:  Jamal King is a 47 y  o female who was referred for evaluation of    First Screening  Currently:  Symptoms include:  no abdominal pain, change in bowel habits, or black or bloody stools  Family history of colon cancer: Father and paternal grandfather had colon cancer   Father was diagnosised at 79 with stage 4 colon cancer        Anticoagulation: none    A1C: none     LABS:      Lab Results   Component Value Date    WBC 3 26 (L) 11/03/2022    HGB 12 9 11/03/2022    HCT 38 9 11/03/2022    MCV 94 11/03/2022     11/03/2022     Lab Results   Component Value Date    K 4 0 11/03/2022     11/03/2022    CO2 29 11/03/2022    BUN 16 11/03/2022    CREATININE 0 90 11/03/2022    GLUF 86 11/03/2022    CALCIUM 9 3 11/03/2022    AST 23 11/03/2022    ALT 24 11/03/2022    ALKPHOS 83 11/03/2022    EGFR 74 11/03/2022     No results found for: HGBA1C  No results found for: INR, PROTIME      No orders to display           ROS:  General ROS: negative for - chills, fatigue, fever or night sweats, weight loss  Respiratory ROS: no cough, shortness of breath, or wheezing  Cardiovascular ROS: no chest pain or dyspnea on exertion  Genito-Urinary ROS: no dysuria, trouble voiding, or hematuria  Musculoskeletal ROS: negative for - gait disturbance, joint pain or muscle pain  Neurological ROS: no TIA or stroke symptoms  GI ROS: see HPI  Skin ROS: no new rashes or lesions   Lymphatic ROS: no new adenopathy noted by pt  GYN ROS: see HPI, no new GYN history or bleeding noted  Psy ROS: no new mental or behavioral disturbances           Imaging: none  Patient Active Problem List   Diagnosis   • Herpes zoster without complication   • Right flank pain   • Healthcare maintenance   • Encounter for screening mammogram for malignant neoplasm of breast         Allergies:  Patient has no known allergies  No current outpatient medications on file  Past Medical History:   Diagnosis Date   • Herpes zoster without complication 2/52/1349       History reviewed  No pertinent surgical history      Family History   Problem Relation Age of Onset   • Stroke Mother    • Coronary artery disease Mother    • Colon cancer Father 72   • No Known Problems Sister    • No Known Problems Sister    • No Known Problems Sister    • No Known Problems Sister    • No Known Problems Daughter    • Thyroid cancer Daughter 29   • No Known Problems Daughter    • No Known Problems Daughter    • No Known Problems Daughter    • Colon cancer Paternal Grandfather    • No Known Problems Son        Social History     Socioeconomic History   • Marital status: /Civil Union     Spouse name: None   • Number of children: None   • Years of education: None   • Highest education level: None   Occupational History   • None   Tobacco Use   • Smoking status: Former     Types: Cigarettes   • Smokeless tobacco: Never   Vaping Use   • Vaping Use: Never used   Substance and Sexual Activity   • Alcohol use: No   • Drug use: No   • Sexual activity: None   Other Topics Concern   • None   Social History Narrative   • None     Social Determinants of Health     Financial Resource Strain: Not on file   Food Insecurity: Not on file   Transportation Needs: Not on file   Physical Activity: Not on file   Stress: Not on file   Social Connections: Not on file   Intimate Partner Violence: Not on file   Housing Stability: Not on file       Exam:   Vitals: 12/20/22 0757   BP: 116/78   Pulse: (!) 49   Temp: (!) 96 5 °F (35 8 °C)           ______________________________________________________    PHYSICAL EXAM  General Appearance:    Alert, cooperative, no distress,      Head:    Normocephalic without obvious abnormality   Eyes:    PERRL, conjunctiva/corneas clear,        Neck:   Supple, no adenopathy, no JVD   Back:     Symmetric, no spinal or CVA tenderness   Lungs:     Clear to auscultation bilaterally,    Heart:    Regular rate and rhythm, S1 and S2 normal, no murmur   Abdomen:     Non-tender in RUQ, LUQ, RLQ, LLQ, no altman's signs  No visible masses or pulsations  Extremities:   Extremities normal  No clubbing, cyanosis or edema   Psych:   Normal Affect   Neurologic:   CNII-XII intact  Strength symmetric, speech intact                 Johny Holley PA-C  Date: 12/20/2022 Time: 8:07 AM       This report has been generated by a voice recognition software system  Therefore, there may be syntax, spelling, and/or grammatical errors  Please call if you've any questions  This  encounter has been billed by a non certified Coder  Informed consent for procedure was personally discussed, reviewed, and signed by Dr Evelia Lock  Discussion by Dr Evelia Lock was carried out regarding risks, benefits, and alternatives with the patient  Risks include but are not limited to:  bleeding, infection, and delayed wound healing or an open wound, pulmonary embolus, leaks from bowel or bile ducts or other viscus, transfusions, death  Discussed in further detail the more common complications and their rates of occurrence   was used if necessary  Patient expressed understanding of the issues discussed and wished/consented to proceed  All questions were answered by Dr Evelia Lock  Discussion performed between patient and the provider signing below       Signature:   Nnamdi Bocanegra  Date: 12/20/2022 Time: 8:07 AM

## 2022-12-20 ENCOUNTER — OFFICE VISIT (OUTPATIENT)
Dept: SURGERY | Facility: CLINIC | Age: 52
End: 2022-12-20

## 2022-12-20 VITALS
DIASTOLIC BLOOD PRESSURE: 78 MMHG | WEIGHT: 135.6 LBS | HEIGHT: 68 IN | SYSTOLIC BLOOD PRESSURE: 116 MMHG | HEART RATE: 49 BPM | BODY MASS INDEX: 20.55 KG/M2 | TEMPERATURE: 96.5 F

## 2022-12-20 DIAGNOSIS — Z12.31 ENCOUNTER FOR SCREENING MAMMOGRAM FOR MALIGNANT NEOPLASM OF BREAST: Primary | ICD-10-CM

## 2022-12-30 RX ORDER — SODIUM CHLORIDE 9 MG/ML
125 INJECTION, SOLUTION INTRAVENOUS CONTINUOUS
Status: CANCELLED | OUTPATIENT
Start: 2022-12-30

## 2023-01-03 ENCOUNTER — ANESTHESIA (OUTPATIENT)
Dept: GASTROENTEROLOGY | Facility: HOSPITAL | Age: 53
End: 2023-01-03

## 2023-01-03 ENCOUNTER — HOSPITAL ENCOUNTER (OUTPATIENT)
Dept: GASTROENTEROLOGY | Facility: HOSPITAL | Age: 53
Setting detail: OUTPATIENT SURGERY
Discharge: HOME | End: 2023-01-03
Attending: SURGERY

## 2023-01-03 ENCOUNTER — ANESTHESIA EVENT (OUTPATIENT)
Dept: GASTROENTEROLOGY | Facility: HOSPITAL | Age: 53
End: 2023-01-03

## 2023-01-03 VITALS
TEMPERATURE: 98.2 F | HEART RATE: 42 BPM | OXYGEN SATURATION: 99 % | DIASTOLIC BLOOD PRESSURE: 87 MMHG | SYSTOLIC BLOOD PRESSURE: 155 MMHG | RESPIRATION RATE: 19 BRPM

## 2023-01-03 DIAGNOSIS — Z12.11 ENCOUNTER FOR SCREENING COLONOSCOPY: ICD-10-CM

## 2023-01-03 LAB
EXT PREGNANCY TEST URINE: NEGATIVE
EXT. CONTROL: NORMAL

## 2023-01-03 RX ORDER — SODIUM CHLORIDE 9 MG/ML
125 INJECTION, SOLUTION INTRAVENOUS CONTINUOUS
Status: DISCONTINUED | OUTPATIENT
Start: 2023-01-03 | End: 2023-01-07 | Stop reason: HOSPADM

## 2023-01-03 RX ORDER — LIDOCAINE HYDROCHLORIDE 20 MG/ML
INJECTION, SOLUTION EPIDURAL; INFILTRATION; INTRACAUDAL; PERINEURAL AS NEEDED
Status: DISCONTINUED | OUTPATIENT
Start: 2023-01-03 | End: 2023-01-03

## 2023-01-03 RX ORDER — PROPOFOL 10 MG/ML
INJECTION, EMULSION INTRAVENOUS AS NEEDED
Status: DISCONTINUED | OUTPATIENT
Start: 2023-01-03 | End: 2023-01-03

## 2023-01-03 RX ADMIN — PROPOFOL 50 MG: 10 INJECTION, EMULSION INTRAVENOUS at 12:06

## 2023-01-03 RX ADMIN — PROPOFOL 50 MG: 10 INJECTION, EMULSION INTRAVENOUS at 12:13

## 2023-01-03 RX ADMIN — PROPOFOL 30 MG: 10 INJECTION, EMULSION INTRAVENOUS at 12:20

## 2023-01-03 RX ADMIN — PROPOFOL 80 MG: 10 INJECTION, EMULSION INTRAVENOUS at 11:54

## 2023-01-03 RX ADMIN — LIDOCAINE HYDROCHLORIDE 100 MG: 20 INJECTION, SOLUTION EPIDURAL; INFILTRATION; INTRACAUDAL; PERINEURAL at 11:51

## 2023-01-03 RX ADMIN — PROPOFOL 50 MG: 10 INJECTION, EMULSION INTRAVENOUS at 12:01

## 2023-01-03 RX ADMIN — PROPOFOL 50 MG: 10 INJECTION, EMULSION INTRAVENOUS at 11:57

## 2023-01-03 RX ADMIN — SODIUM CHLORIDE 125 ML/HR: 0.9 INJECTION, SOLUTION INTRAVENOUS at 11:28

## 2023-01-03 RX ADMIN — PROPOFOL 120 MG: 10 INJECTION, EMULSION INTRAVENOUS at 11:51

## 2023-01-03 NOTE — DISCHARGE INSTR - AVS FIRST PAGE
[facility]  Endoscopy Post-Operative Instructions  Dr Sushila Noriega MD, FACS    Procedure: Colonoscopy    Findings:  Colon Polyp(s)    Follow-Up: You will need a repeat Endoscopy in (generally)5 years  3-5 years   Will await final pathology report for final determination of number of years until your follow up endoscopy, if you had polyps on this exam   Different types of polyps require different lengths of follow up surveillance  Please call our office or your primary doctor's office if you have any questions, once the report is returned  You should have an endoscopy sooner than recommended if you have any symptoms of bleeding or change in stools or other concerns  You will receive a call from our office with your results, in addition to the the preliminary results you received today  You will usually receive a follow-up letter from our office in 1-2 weeks  Call the office if you do not hear from us  You are welcome to also schedule an office visit if desired to discuss the results further  It is your responsibility to contact our office for results in 1- 2 weeks if you do not hear from us  If a follow up endoscopy is needed, you are responsible for arranging that follow up appointment at the appropriate time  The office may or may not issue a reminder at that future time  Please take responsibility for your own follow up healthcare  Diet: Eat a light snack first, and then resume your previous diet  Call the office if you have unusual fevers, chills, nausea or vomiting or abdominal pain  Report to the emergency room with these are severe in nature  Activity: Do not drive a car, operate machinery, or sign legal documents for 24 hours after your procedure  Normal activity may be resumed on the day following the procedure       Call the office at [phone] for any of the following: Severe abdominal pain, significant rectal bleeding, chills, or fever above 100°, new onset of persistent cough or persistent vomiting       [address], [address]  [place], [address]   Phone: [phone]

## 2023-01-03 NOTE — INTERVAL H&P NOTE
H&P reviewed  After examining the patient I find no changes in the patients condition since the H&P had been written      Vitals:    01/03/23 1114   Pulse: (!) 51   Resp: 16   Temp: 98 2 °F (36 8 °C)   SpO2: 99%

## 2023-01-03 NOTE — ANESTHESIA POSTPROCEDURE EVALUATION
Post-Op Assessment Note    CV Status:  Stable    Pain management: adequate     Mental Status:  Alert and awake   Hydration Status:  Euvolemic   PONV Controlled:  Controlled   Airway Patency:  Patent      Post Op Vitals Reviewed: Yes      Staff: Anesthesiologist         No notable events documented      BP      Temp      Pulse    Resp      SpO2      /87   Pulse (!) 42   Temp 98 2 °F (36 8 °C) (Temporal)   Resp 19   SpO2 99%

## 2023-01-03 NOTE — ANESTHESIA PREPROCEDURE EVALUATION
Procedure:  COLONOSCOPY    Relevant Problems   Other   (+) Herpes zoster without complication        Physical Exam    Airway    Mallampati score: II  TM Distance: >3 FB  Neck ROM: full     Dental   No notable dental hx     Cardiovascular  Rhythm: regular, Rate: normal, Cardiovascular exam normal    Pulmonary  Pulmonary exam normal Breath sounds clear to auscultation,     Other Findings        Anesthesia Plan  ASA Score- 1     Anesthesia Type- IV sedation with anesthesia with ASA Monitors  Additional Monitors:   Airway Plan:     Comment: GA prn  Plan Factors-    Chart reviewed  Existing labs reviewed  Patient summary reviewed  Patient is not a current smoker  Patient instructed to abstain from smoking on day of procedure  Patient did not smoke on day of surgery  Induction- intravenous  Postoperative Plan-     Informed Consent- Anesthetic plan and risks discussed with patient and spouse (Kavin Juarez)  I personally reviewed this patient with the CRNA  Discussed and agreed on the Anesthesia Plan with the CRNA  Rivas Cervantes

## 2023-01-04 ENCOUNTER — TELEPHONE (OUTPATIENT)
Dept: SURGERY | Facility: CLINIC | Age: 53
End: 2023-01-04

## 2023-01-04 NOTE — TELEPHONE ENCOUNTER
Patient returned my call  She stated she is doing pretty good  She stated she is experiencing a headache but believe it is a tension headache and is unrelated to the procedure  She denies having any F/V/N/C and does not believe she has had a BM yet  Instructions provided regarding BMs  She is aware a polyp was found and removed - sent for path  We will call with the results as well as the official follow up time  Path pending

## 2023-04-11 ENCOUNTER — TELEPHONE (OUTPATIENT)
Dept: FAMILY MEDICINE CLINIC | Facility: CLINIC | Age: 53
End: 2023-04-11

## 2023-04-11 DIAGNOSIS — R92.8 ABNORMAL MAMMOGRAM: Primary | ICD-10-CM

## 2023-04-11 NOTE — TELEPHONE ENCOUNTER
----- Message from Ariel Kelley sent at 4/11/2023  3:38 PM EDT -----  Regarding: Seeking Prescription/Referral  Contact: [phone]  Hello!     I have an appointment scheduled at [address] [place] [address]  in [facility] on April 18 and need to obtain a prescription/referral for:    6 month follow-up diagnostic mammogram - right    Aneta Bradley

## 2023-04-28 ENCOUNTER — TELEPHONE (OUTPATIENT)
Dept: FAMILY MEDICINE CLINIC | Facility: CLINIC | Age: 53
End: 2023-04-28

## 2023-04-28 NOTE — TELEPHONE ENCOUNTER
----- Message from Edgar Hansen MD sent at 4/28/2023  6:44 AM EDT -----  Benign mammogram   Continue routine screening with mammogram in 1 year

## 2023-05-20 ENCOUNTER — OFFICE VISIT (OUTPATIENT)
Dept: URGENT CARE | Facility: MEDICAL CENTER | Age: 53
End: 2023-05-20

## 2023-05-20 VITALS
SYSTOLIC BLOOD PRESSURE: 182 MMHG | HEIGHT: 68 IN | OXYGEN SATURATION: 100 % | TEMPERATURE: 98 F | WEIGHT: 135 LBS | BODY MASS INDEX: 20.46 KG/M2 | DIASTOLIC BLOOD PRESSURE: 100 MMHG | RESPIRATION RATE: 20 BRPM | HEART RATE: 60 BPM

## 2023-05-20 DIAGNOSIS — S61.411A LACERATION OF RIGHT HAND WITHOUT FOREIGN BODY, INITIAL ENCOUNTER: Primary | ICD-10-CM

## 2023-05-20 NOTE — PROGRESS NOTES
"  [facility]        NAME: Annabelle Vilchis is a 47 y o  female  : 1970    MRN: [id number]  DATE: May 20, 2023  TIME: 12:36 PM    Assessment and Plan   Laceration of right hand without foreign body, initial encounter [S61 [id number]]  1  Laceration of right hand without foreign body, initial encounter          See repair note  Neosporin applied   F/u in 7-10 days for removal  Pt in agreement with plan of care    Patient Instructions     Follow up with PCP in 3-5 days  Proceed to  ER if symptoms worsen  Chief Complaint     Chief Complaint   Patient presents with   • Fall     Patient was running and fell onto her R hand and knees  History of Present Illness   Annabelle Vilchis presents to the clinic c/o    Pt states was running in a 5k -   Tripped when the raceway changed from [email or web address] to [employer]  Review of Systems   Review of Systems   All other systems reviewed and are negative  Current Medications     No long-term medications on file  Current Allergies     Allergies as of 2023   • (No Known Allergies)            The following portions of the patient's history were reviewed and updated as appropriate: allergies, current medications, past family history, past medical history, past social history, past surgical history and problem list     Objective   BP (!) 182/100   Pulse 60   Temp 98 °F (36 7 °C)   Resp 20   Ht 5' 8\" (1 727 m)   Wt 61 2 kg (135 lb)   SpO2 100%   BMI 20 53 kg/m²          Physical Exam     Physical Exam  Vitals and nursing note reviewed  Constitutional:       Appearance: Normal appearance  She is well-developed  HENT:      Head: Normocephalic and atraumatic  Nose: Nose normal       Mouth/Throat:      Mouth: Mucous membranes are moist    Eyes:      General: Lids are normal       Extraocular Movements: Extraocular movements intact        Conjunctiva/sclera: Conjunctivae normal       Pupils: Pupils are equal, round, and reactive to " "light  Cardiovascular:      Rate and Rhythm: Normal rate and regular rhythm  Pulses: Normal pulses  Heart sounds: Normal heart sounds, S1 normal and S2 normal    Pulmonary:      Effort: Pulmonary effort is normal       Breath sounds: Normal breath sounds  Musculoskeletal:      Cervical back: Normal range of motion and neck supple  Skin:     General: Skin is warm and dry  Findings: Abrasion and laceration present  Neurological:      Mental Status: She is alert and oriented to person, place, and time  Psychiatric:         Speech: Speech normal          Behavior: Behavior normal  Behavior is cooperative  Thought Content: Thought content normal          Judgment: Judgment normal            Laceration repair    Date/Time: 5/20/2023 10:25 AM  Performed by: SAVANNAH Madera  Authorized by: SAVANNAH Madera   Consent: Verbal consent obtained  Risks and benefits: risks, benefits and alternatives were discussed  Consent given by: patient  Patient understanding: patient states understanding of the procedure being performed  Patient consent: the patient's understanding of the procedure matches consent given  Procedure consent: procedure consent matches procedure scheduled  Relevant documents: relevant documents present and verified  Test results: test results available and properly labeled  Site marked: the operative site was marked  Radiology Images displayed and confirmed  If images not available, report reviewed: imaging studies available  Required items: required blood products, implants, devices, and special equipment available  Patient identity confirmed: verbally with patient  Time out: Immediately prior to procedure a \"time out\" was called to verify the correct patient, procedure, equipment, support staff and site/side marked as required    Body area: upper extremity  Location details: right hand  Laceration length: 3 cm  Foreign bodies: no foreign bodies  Tendon " involvement: none  Nerve involvement: none  Vascular damage: no  Anesthesia: local infiltration    Anesthesia:  Local Anesthetic: lidocaine 1% without epinephrine  Anesthetic total: 4 mL    Sedation:  Patient sedated: no      Wound Dehiscence:  Superficial Wound Dehiscence: simple closure      Procedure Details:  Preparation: Patient was prepped and draped in the usual sterile fashion    Irrigation solution: saline  Irrigation method: syringe  Amount of cleaning: standard  Debridement: none  Degree of undermining: none  Skin closure: 4-0 nylon  Number of sutures: 4  Technique: simple  Approximation: close  Approximation difficulty: simple  Dressing: antibiotic ointment  Patient tolerance: patient tolerated the procedure well with no immediate complications

## 2023-05-20 NOTE — PATIENT INSTRUCTIONS
Removal in 7-10 days  Laceration   AMBULATORY CARE:   A laceration  is an injury to the skin and the soft tissue underneath it  Lacerations can happen anywhere on the body  Common symptoms include the following:   Injury or wound to skin and tissue of any shape size that looks like a cut, tear, or gash    Edges of the wound may be close together or wide apart    Pain, bleeding, bruising, or swelling    Numbness around the wound    Decreased movement in an area below the wound    Seek care immediately if:   You have heavy bleeding or bleeding that does not stop after 10 minutes of holding firm, direct pressure over the wound  Your wound opens up  Call your doctor if:   You have a fever or chills  Your laceration is red, warm, or swollen  You have red streaks on your skin coming from your wound  You have white or yellow drainage from the wound that smells bad  You have pain that gets worse, even after treatment  You have questions or concerns about your condition or care  Treatment for a laceration  includes care to stop any bleeding  Your healthcare provider will stop the bleeding by applying pressure to the wound  He or she may need to check your wound for foreign objects and clean it to decrease the chance of infection  Your laceration may be closed with stitches, staples, tissue glue, or medical strips  Ask your healthcare provider if you need a tetanus shot  Medicines: You may need any of the following:  Prescription pain medicine  may be given  Ask your healthcare provider how to take this medicine safely  Some prescription pain medicines contain acetaminophen  Do not take other medicines that contain acetaminophen without talking to your healthcare provider  Too much acetaminophen may cause liver damage  Prescription pain medicine may cause constipation  Ask your healthcare provider how to prevent or treat constipation       Antibiotics  help treat or prevent a bacterial infection  Take your medicine as directed  Contact your healthcare provider if you think your medicine is not helping or if you have side effects  Tell your provider if you are allergic to any medicine  Keep a list of the medicines, vitamins, and herbs you take  Include the amounts, and when and why you take them  Bring the list or the pill bottles to follow-up visits  Carry your medicine list with you in case of an emergency  Care for your wound as directed:   Do not get your wound wet  until your healthcare provider says it is okay  Do not soak your wound in water  Do not go swimming until your healthcare provider says it is okay  Carefully wash the wound with soap and water  Gently pat the area dry or allow it to air dry  Change your bandages  when they get wet, dirty, or after washing  Apply new, clean bandages as directed  Do not apply elastic bandages or tape too tight  Do not put powders or lotions over your incision  Apply antibiotic ointment as directed  Your healthcare provider may give you antibiotic ointment to put over your wound if you have stitches  If you have strips of tape over your incision, let them dry up and fall off on their own  If they do not fall off within 14 days, gently remove them  If you have glue over your wound, do not remove or pick at it  If your glue comes off, do not replace it with glue that you have at home  Check your wound every day for signs of infection, such as swelling, redness, or pus  Self-care:   Apply ice  on your wound for 15 to 20 minutes every hour or as directed  Use an ice pack, or put crushed ice in a plastic bag  Cover it with a towel  Ice helps prevent tissue damage and decreases swelling and pain  Use a splint as directed  A splint will decrease movement and stress on your wound  It may help it heal faster  A splint may be used for lacerations over joints or areas of your body that bend   Ask your healthcare provider how to apply and remove a splint  Decrease scarring of your wound  by applying ointments as directed  Do not apply ointments until your healthcare provider says it is okay  You may need to wait until your wound is healed  Ask which ointment to buy and how often to use it  After your wound is healed, use sunscreen over the area when you are out in the sun  You should do this for at least 6 months to 1 year after your injury  Follow up with your doctor as directed: You will need to return in 3 to 14 days to have stitches or staples removed  Write down your questions so you remember to ask them during your visits  © Copyright [employer] 2022 Information is for End User's use only and may not be sold, redistributed or otherwise used for commercial purposes  The above information is an  only  It is not intended as medical advice for individual conditions or treatments  Talk to your doctor, nurse or pharmacist before following any medical regimen to see if it is safe and effective for you

## 2023-05-30 ENCOUNTER — OFFICE VISIT (OUTPATIENT)
Dept: URGENT CARE | Age: 53
End: 2023-05-30

## 2023-05-30 VITALS
RESPIRATION RATE: 18 BRPM | SYSTOLIC BLOOD PRESSURE: 169 MMHG | HEART RATE: 56 BPM | DIASTOLIC BLOOD PRESSURE: 90 MMHG | OXYGEN SATURATION: 100 % | TEMPERATURE: 98.2 F

## 2023-05-30 DIAGNOSIS — Z48.02 ENCOUNTER FOR REMOVAL OF SUTURES: Primary | ICD-10-CM

## 2023-05-30 NOTE — PROGRESS NOTES
[facility]        NAME: Kalpesh Sagastume is a 47 y o  female  : 1970    MRN: [id number]  DATE: May 30, 2023  TIME: 8:36 AM    Assessment and Plan   Encounter for removal of sutures [Z48 02]  1  Encounter for removal of sutures          Patient presents for removal of sutures to right hand placed on 20- healing WNL  No issues, drainage, swelling redness  4 sutures intact  Removed without complication  Patient Instructions       Follow up with PCP as needed    Chief Complaint     Chief Complaint   Patient presents with   • Suture / Staple Removal         History of Present Illness       Patient presents for removal of sutures to right hand placed on 520- healing WNL  No issues, drainage, swelling redness  4 sutures intact  Removed without complication  Review of Systems   Review of Systems   Skin: Positive for wound  All other systems reviewed and are negative          Current Medications       Current Outpatient Medications:   •  Ferrous Sulfate (IRON PO), Take by mouth, Disp: , Rfl:   •  VITAMIN D PO, Take by mouth, Disp: , Rfl:     Current Allergies     Allergies as of 2023   • (No Known Allergies)            The following portions of the patient's history were reviewed and updated as appropriate: allergies, current medications, past family history, past medical history, past social history, past surgical history and problem list      Past Medical History:   Diagnosis Date   • Herpes zoster without complication 8517       Past Surgical History:   Procedure Laterality Date   • ARTHROSCOPIC REPAIR ACL Right    • CERVICAL BIOPSY     • COLONOSCOPY W/ POLYPECTOMY  2023    5 YEAR F/U; FAMILY HISTORY       Family History   Problem Relation Age of Onset   • Stroke Mother    • Coronary artery disease Mother    • Colon cancer Father 72   • No Known Problems Sister    • No Known Problems Sister    • No Known Problems Sister    • No Known Problems Sister    • No Known Problems Daughter    • Thyroid cancer Daughter 29   • No Known Problems Daughter    • No Known Problems Daughter    • No Known Problems Daughter    • Colon cancer Paternal Grandfather    • No Known Problems Son          Medications have been verified  Objective   /90   Pulse 56   Temp 98 2 °F (36 8 °C) (Temporal)   Resp 18   SpO2 100%   No LMP recorded  Physical Exam     Physical Exam  Vitals reviewed  Constitutional:       Appearance: Normal appearance  Skin:     Findings: Laceration present  Comments: Healing lac- 4 sutures intact  Neurological:      Mental Status: She is alert  Suture removal    Date/Time: 5/30/2023 8:30 AM    Performed by: SAVANNAH Ellison  Authorized by: SAVANNAH Ellison  Universal Protocol:  Consent: Verbal consent obtained  Consent given by: patient  Patient understanding: patient states understanding of the procedure being performed  Patient identity confirmed: verbally with patient        Location:     Laterality:  Right    Location:  Upper extremity    Upper extremity location:  Hand    Hand location:  R hand  Procedure details: Tools used:  Suture removal kit    Wound appearance:  No sign(s) of infection, good wound healing, nontender, nonpurulent and clean    Number of sutures removed:  4  Post-procedure details:     Patient tolerance of procedure:   Tolerated well, no immediate complications

## 2023-11-09 ENCOUNTER — OFFICE VISIT (OUTPATIENT)
Dept: FAMILY MEDICINE CLINIC | Facility: CLINIC | Age: 53
End: 2023-11-09
Payer: COMMERCIAL

## 2023-11-09 VITALS
HEART RATE: 64 BPM | BODY MASS INDEX: 21.22 KG/M2 | RESPIRATION RATE: 16 BRPM | HEIGHT: 68 IN | SYSTOLIC BLOOD PRESSURE: 138 MMHG | OXYGEN SATURATION: 99 % | DIASTOLIC BLOOD PRESSURE: 86 MMHG | TEMPERATURE: 97.4 F | WEIGHT: 140 LBS
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
## 2023-11-09 DIAGNOSIS — Z00.00 HEALTHCARE MAINTENANCE: ICD-10-CM

## 2023-11-09 DIAGNOSIS — Z12.31 ENCOUNTER FOR SCREENING MAMMOGRAM FOR MALIGNANT NEOPLASM OF BREAST: ICD-10-CM

## 2023-11-09 DIAGNOSIS — S59.911A INJURY OF RIGHT FOREARM, INITIAL ENCOUNTER: ICD-10-CM

## 2023-11-09 PROCEDURE — 99214 OFFICE O/P EST MOD 30 MIN: CPT | Performed by: NURSE PRACTITIONER

## 2023-11-09 NOTE — ASSESSMENT & PLAN NOTE
- Will obtain x-ray. Continue to apply ice. Can take Tylenol or Motrin for pain. - Will continue to follow up.
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
## 2023-11-10 ENCOUNTER — HOSPITAL ENCOUNTER (OUTPATIENT)
Dept: RADIOLOGY | Facility: IMAGING CENTER | Age: 53
End: 2023-11-10
Payer: COMMERCIAL
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
## 2023-11-10 DIAGNOSIS — S59.911A INJURY OF RIGHT FOREARM, INITIAL ENCOUNTER: ICD-10-CM

## 2023-11-10 PROCEDURE — 73090 X-RAY EXAM OF FOREARM: CPT

## 2024-02-03 ENCOUNTER — APPOINTMENT (OUTPATIENT)
Dept: LAB | Facility: IMAGING CENTER | Age: 54
End: 2024-02-03
Payer: COMMERCIAL

## 2024-02-03 DIAGNOSIS — Z00.00 HEALTHCARE MAINTENANCE: ICD-10-CM

## 2024-02-03 LAB
ALBUMIN SERPL BCP-MCNC: 4.1 G/DL (ref 3.5–5)
ALP SERPL-CCNC: 74 U/L (ref 34–104)
ALT SERPL W P-5'-P-CCNC: 24 U/L (ref 7–52)
ANION GAP SERPL CALCULATED.3IONS-SCNC: 8 MMOL/L
AST SERPL W P-5'-P-CCNC: 32 U/L (ref 13–39)
BASOPHILS # BLD AUTO: 0.03 THOUSANDS/ÂΜL (ref 0–0.1)
BASOPHILS NFR BLD AUTO: 1 % (ref 0–1)
BILIRUB SERPL-MCNC: 0.57 MG/DL (ref 0.2–1)
BUN SERPL-MCNC: 13 MG/DL (ref 5–25)
CALCIUM SERPL-MCNC: 9.5 MG/DL (ref 8.4–10.2)
CHLORIDE SERPL-SCNC: 104 MMOL/L (ref 96–108)
CHOLEST SERPL-MCNC: 179 MG/DL
CO2 SERPL-SCNC: 30 MMOL/L (ref 21–32)
CREAT SERPL-MCNC: 0.83 MG/DL (ref 0.6–1.3)
EOSINOPHIL # BLD AUTO: 0.09 THOUSAND/ÂΜL (ref 0–0.61)
EOSINOPHIL NFR BLD AUTO: 3 % (ref 0–6)
ERYTHROCYTE [DISTWIDTH] IN BLOOD BY AUTOMATED COUNT: 13.6 % (ref 11.6–15.1)
GFR SERPL CREATININE-BSD FRML MDRD: 81 ML/MIN/1.73SQ M
GLUCOSE P FAST SERPL-MCNC: 70 MG/DL (ref 65–99)
HCT VFR BLD AUTO: 39.3 % (ref 34.8–46.1)
HDLC SERPL-MCNC: 85 MG/DL
HGB BLD-MCNC: 12.5 G/DL (ref 11.5–15.4)
IMM GRANULOCYTES # BLD AUTO: 0.01 THOUSAND/UL (ref 0–0.2)
IMM GRANULOCYTES NFR BLD AUTO: 0 % (ref 0–2)
LDLC SERPL CALC-MCNC: 86 MG/DL (ref 0–100)
LYMPHOCYTES # BLD AUTO: 1.26 THOUSANDS/ÂΜL (ref 0.6–4.47)
LYMPHOCYTES NFR BLD AUTO: 35 % (ref 14–44)
MCH RBC QN AUTO: 30.8 PG (ref 26.8–34.3)
MCHC RBC AUTO-ENTMCNC: 31.8 G/DL (ref 31.4–37.4)
MCV RBC AUTO: 97 FL (ref 82–98)
MONOCYTES # BLD AUTO: 0.31 THOUSAND/ÂΜL (ref 0.17–1.22)
MONOCYTES NFR BLD AUTO: 9 % (ref 4–12)
NEUTROPHILS # BLD AUTO: 1.93 THOUSANDS/ÂΜL (ref 1.85–7.62)
NEUTS SEG NFR BLD AUTO: 52 % (ref 43–75)
NRBC BLD AUTO-RTO: 0 /100 WBCS
PLATELET # BLD AUTO: 297 THOUSANDS/UL (ref 149–390)
PMV BLD AUTO: 10.9 FL (ref 8.9–12.7)
POTASSIUM SERPL-SCNC: 3.9 MMOL/L (ref 3.5–5.3)
PROT SERPL-MCNC: 7.1 G/DL (ref 6.4–8.4)
RBC # BLD AUTO: 4.06 MILLION/UL (ref 3.81–5.12)
SODIUM SERPL-SCNC: 142 MMOL/L (ref 135–147)
TRIGL SERPL-MCNC: 42 MG/DL
TSH SERPL DL<=0.05 MIU/L-ACNC: 1.49 UIU/ML (ref 0.45–4.5)
WBC # BLD AUTO: 3.63 THOUSAND/UL (ref 4.31–10.16)

## 2024-02-03 PROCEDURE — 80053 COMPREHEN METABOLIC PANEL: CPT

## 2024-02-03 PROCEDURE — 80061 LIPID PANEL: CPT

## 2024-02-03 PROCEDURE — 85025 COMPLETE CBC W/AUTO DIFF WBC: CPT

## 2024-02-03 PROCEDURE — 84443 ASSAY THYROID STIM HORMONE: CPT

## 2024-02-03 PROCEDURE — 36415 COLL VENOUS BLD VENIPUNCTURE: CPT

## 2024-02-19 ENCOUNTER — TELEPHONE (OUTPATIENT)
Dept: FAMILY MEDICINE CLINIC | Facility: CLINIC | Age: 54
End: 2024-02-19

## 2024-02-21 PROBLEM — Z00.00 HEALTHCARE MAINTENANCE: Status: RESOLVED | Noted: 2020-08-11 | Resolved: 2024-02-21

## 2024-06-18 ENCOUNTER — HOSPITAL ENCOUNTER (OUTPATIENT)
Facility: HOSPITAL | Age: 54
Setting detail: OBSERVATION
Discharge: HOME | End: 2024-06-19
Attending: EMERGENCY MEDICINE | Admitting: INTERNAL MEDICINE
Payer: COMMERCIAL

## 2024-06-18 DIAGNOSIS — L03.115 CELLULITIS OF RIGHT LEG: ICD-10-CM

## 2024-06-18 DIAGNOSIS — S89.92XD INJURY OF LEFT KNEE, SUBSEQUENT ENCOUNTER: Primary | ICD-10-CM

## 2024-06-18 LAB
ALBUMIN SERPL BCG-MCNC: 3.5 G/DL (ref 3.5–5)
ALP SERPL-CCNC: 57 U/L (ref 34–104)
ALT SERPL W P-5'-P-CCNC: 8 U/L (ref 7–52)
ANION GAP SERPL CALCULATED.3IONS-SCNC: 5 MMOL/L (ref 4–13)
AST SERPL W P-5'-P-CCNC: 17 U/L (ref 13–39)
BASOPHILS # BLD AUTO: 0.03 THOUSANDS/ÂΜL (ref 0–0.1)
BASOPHILS NFR BLD AUTO: 1 % (ref 0–1)
BILIRUB SERPL-MCNC: 0.35 MG/DL (ref 0.2–1)
BUN SERPL-MCNC: 20 MG/DL (ref 5–25)
CALCIUM SERPL-MCNC: 8.8 MG/DL (ref 8.4–10.2)
CHLORIDE SERPL-SCNC: 104 MMOL/L (ref 96–108)
CO2 SERPL-SCNC: 30 MMOL/L (ref 21–32)
CREAT SERPL-MCNC: 0.96 MG/DL (ref 0.6–1.3)
CRP SERPL QL: 13.2 MG/L
EOSINOPHIL # BLD AUTO: 0.09 THOUSAND/ÂΜL (ref 0–0.61)
EOSINOPHIL NFR BLD AUTO: 2 % (ref 0–6)
ERYTHROCYTE [DISTWIDTH] IN BLOOD BY AUTOMATED COUNT: 13.1 % (ref 11.6–15.1)
ERYTHROCYTE [SEDIMENTATION RATE] IN BLOOD: 33 MM/HOUR (ref 0–29)
GFR SERPL CREATININE-BSD FRML MDRD: 68 ML/MIN/1.73SQ M
GLUCOSE SERPL-MCNC: 96 MG/DL (ref 65–140)
HCT VFR BLD AUTO: 34.2 % (ref 34.8–46.1)
HGB BLD-MCNC: 11.6 G/DL (ref 11.5–15.4)
IMM GRANULOCYTES # BLD AUTO: 0 THOUSAND/UL (ref 0–0.2)
IMM GRANULOCYTES NFR BLD AUTO: 0 % (ref 0–2)
LYMPHOCYTES # BLD AUTO: 1.49 THOUSANDS/ÂΜL (ref 0.6–4.47)
LYMPHOCYTES NFR BLD AUTO: 30 % (ref 14–44)
MCH RBC QN AUTO: 31.6 PG (ref 26.8–34.3)
MCHC RBC AUTO-ENTMCNC: 33.9 G/DL (ref 31.4–37.4)
MCV RBC AUTO: 93 FL (ref 82–98)
MONOCYTES # BLD AUTO: 0.46 THOUSAND/ÂΜL (ref 0.17–1.22)
MONOCYTES NFR BLD AUTO: 9 % (ref 4–12)
NEUTROPHILS # BLD AUTO: 2.97 THOUSANDS/ÂΜL (ref 1.85–7.62)
NEUTS SEG NFR BLD AUTO: 58 % (ref 43–75)
NRBC BLD AUTO-RTO: 0 /100 WBCS
PLATELET # BLD AUTO: 291 THOUSANDS/UL (ref 149–390)
PMV BLD AUTO: 10.7 FL (ref 8.9–12.7)
POTASSIUM SERPL-SCNC: 3.9 MMOL/L (ref 3.5–5.3)
PROCALCITONIN SERPL-MCNC: <0.05 NG/ML
PROT SERPL-MCNC: 6.3 G/DL (ref 6.4–8.4)
RBC # BLD AUTO: 3.67 MILLION/UL (ref 3.81–5.12)
SODIUM SERPL-SCNC: 139 MMOL/L (ref 135–147)
WBC # BLD AUTO: 5.04 THOUSAND/UL (ref 4.31–10.16)

## 2024-06-18 PROCEDURE — 84145 PROCALCITONIN (PCT): CPT

## 2024-06-18 PROCEDURE — 86140 C-REACTIVE PROTEIN: CPT

## 2024-06-18 PROCEDURE — 99285 EMERGENCY DEPT VISIT HI MDM: CPT | Performed by: EMERGENCY MEDICINE

## 2024-06-18 PROCEDURE — 36415 COLL VENOUS BLD VENIPUNCTURE: CPT

## 2024-06-18 PROCEDURE — 93005 ELECTROCARDIOGRAM TRACING: CPT

## 2024-06-18 PROCEDURE — 80053 COMPREHEN METABOLIC PANEL: CPT

## 2024-06-18 PROCEDURE — 96365 THER/PROPH/DIAG IV INF INIT: CPT

## 2024-06-18 PROCEDURE — 99284 EMERGENCY DEPT VISIT MOD MDM: CPT

## 2024-06-18 PROCEDURE — 85025 COMPLETE CBC W/AUTO DIFF WBC: CPT

## 2024-06-18 PROCEDURE — 85652 RBC SED RATE AUTOMATED: CPT

## 2024-06-18 RX ORDER — VANCOMYCIN HYDROCHLORIDE 1 G/200ML
15 INJECTION, SOLUTION INTRAVENOUS ONCE
Status: COMPLETED | OUTPATIENT
Start: 2024-06-18 | End: 2024-06-19

## 2024-06-18 RX ADMIN — VANCOMYCIN HYDROCHLORIDE 1000 MG: 1 INJECTION, SOLUTION INTRAVENOUS at 23:28

## 2024-06-19 ENCOUNTER — APPOINTMENT (OUTPATIENT)
Dept: RADIOLOGY | Facility: HOSPITAL | Age: 54
End: 2024-06-19
Payer: COMMERCIAL

## 2024-06-19 VITALS
RESPIRATION RATE: 16 BRPM | SYSTOLIC BLOOD PRESSURE: 155 MMHG | TEMPERATURE: 98 F | OXYGEN SATURATION: 99 % | HEART RATE: 56 BPM | DIASTOLIC BLOOD PRESSURE: 106 MMHG

## 2024-06-19 PROBLEM — S89.92XA INJURY OF LEFT KNEE: Status: ACTIVE | Noted: 2024-06-19

## 2024-06-19 LAB
ATRIAL RATE: 56 BPM
B BURGDOR IGG SERPL QL IA: POSITIVE
B BURGDOR IGG+IGM SER QL IA: POSITIVE
B BURGDOR IGM SERPL QL IA: NEGATIVE
P AXIS: 47 DEGREES
PR INTERVAL: 154 MS
QRS AXIS: 40 DEGREES
QRSD INTERVAL: 84 MS
QT INTERVAL: 426 MS
QTC INTERVAL: 411 MS
T WAVE AXIS: 35 DEGREES
VENTRICULAR RATE: 56 BPM

## 2024-06-19 PROCEDURE — NC001 PR NO CHARGE: Performed by: INTERNAL MEDICINE

## 2024-06-19 PROCEDURE — 86617 LYME DISEASE ANTIBODY: CPT

## 2024-06-19 PROCEDURE — 93010 ELECTROCARDIOGRAM REPORT: CPT | Performed by: INTERNAL MEDICINE

## 2024-06-19 PROCEDURE — 73564 X-RAY EXAM KNEE 4 OR MORE: CPT

## 2024-06-19 PROCEDURE — 99223 1ST HOSP IP/OBS HIGH 75: CPT | Performed by: INTERNAL MEDICINE

## 2024-06-19 PROCEDURE — 86618 LYME DISEASE ANTIBODY: CPT

## 2024-06-19 RX ORDER — NAPROXEN 250 MG/1
250 TABLET ORAL 2 TIMES DAILY WITH MEALS
Qty: 28 TABLET | Refills: 0 | Status: SHIPPED | OUTPATIENT
Start: 2024-06-19 | End: 2024-06-24 | Stop reason: SDUPTHER

## 2024-06-19 RX ORDER — NAPROXEN 500 MG/1
250 TABLET ORAL 2 TIMES DAILY PRN
Status: DISCONTINUED | OUTPATIENT
Start: 2024-06-19 | End: 2024-06-19 | Stop reason: HOSPADM

## 2024-06-19 RX ORDER — ACETAMINOPHEN 325 MG/1
650 TABLET ORAL EVERY 6 HOURS PRN
Status: DISCONTINUED | OUTPATIENT
Start: 2024-06-19 | End: 2024-06-19 | Stop reason: HOSPADM

## 2024-06-19 RX ORDER — ENOXAPARIN SODIUM 100 MG/ML
40 INJECTION SUBCUTANEOUS DAILY
Status: DISCONTINUED | OUTPATIENT
Start: 2024-06-19 | End: 2024-06-19 | Stop reason: HOSPADM

## 2024-06-19 RX ADMIN — NAPROXEN 250 MG: 250 TABLET ORAL at 06:31

## 2024-06-19 NOTE — DISCHARGE INSTR - AVS FIRST PAGE
We will provide you with some literature regarding your swollen knee.  A prescription for Naprosyn will be sent to [facility] in [place].  Please continue taking this medication for the next 2 weeks for knee pain/swelling.    As discussed please follow-up with your primary care provider next Monday.  We obtained blood work for Lyme disease prior to your discharge, we will call you if there are any concerning results.    If you develop a fever, worsening knee pain or swelling, or any other concerning symptoms please seek urgent medical attention.

## 2024-06-19 NOTE — ED PROVIDER NOTES
History  Chief Complaint   Patient presents with    Cellulitis     Pt reports being seen at [facility] on Sunday and was diagnosed with cellulitis on the left foot. Pt reports taking keflex and no improvement. Pts left foot is swollen.      53 year old female with PMHx of herpes zoster presents to the ED for evaluation of left leg pain and swelling over a week ago. Pt notes she was loading her  when she felt the pain behind he left knee, the pain continue to worsened and started moving down the leg. Pt was at [facility] [place] 2 days ago and were prescribed Keflex, pt have been compliant with the antibiotics since Sunday. Pt also had duplex US which was negative for DVT however, they did note a small pocket of fluid collection likely a hematoma. Pt notes the pain and swelling is worsening and spread up towards her thigh. Pt denies fever, chill, N/V, Ha vision changes, chest pain, SOB, abdominal pain, dysuria, recent travels, sick exposure. Pain improves with exertion and worsens with prolonged sitting. Denies hx of blood clots, not on OCP,           Prior to Admission Medications   Prescriptions Last Dose Informant Patient Reported? Taking?   Ferrous Sulfate (IRON PO)   Yes No   Sig: Take by mouth   VITAMIN D PO   Yes No   Sig: Take by mouth      Facility-Administered Medications: None       Past Medical History:   Diagnosis Date    Herpes zoster without complication 1/15/2019       Past Surgical History:   Procedure Laterality Date    ARTHROSCOPIC REPAIR ACL Right     CERVICAL BIOPSY      COLONOSCOPY W/ POLYPECTOMY  01/03/2023    5 YEAR F/U; FAMILY HISTORY       Family History   Problem Relation Age of Onset    Stroke Mother     Coronary artery disease Mother     Colon cancer Father 65    No Known Problems Sister     No Known Problems Sister     No Known Problems Sister     No Known Problems Sister     No Known Problems Daughter     Thyroid cancer Daughter 28    No Known Problems Daughter     No Known Problems Daughter      No Known Problems Daughter     Colon cancer Paternal Grandfather     No Known Problems Son      I have reviewed and agree with the history as documented.    E-Cigarette/Vaping    E-Cigarette Use Never User      E-Cigarette/Vaping Substances     Social History     Tobacco Use    Smoking status: Former     Types: Cigarettes    Smokeless tobacco: Never   Vaping Use    Vaping status: Never Used   Substance Use Topics    Alcohol use: No    Drug use: No        Review of Systems   Constitutional:  Negative for appetite change, chills, diaphoresis, fever and unexpected weight change.   HENT:  Negative for dental problem, ear pain, facial swelling, sore throat and trouble swallowing.    Eyes:  Negative for pain and visual disturbance.   Respiratory:  Negative for cough, chest tightness and shortness of breath.    Cardiovascular:  Negative for chest pain, palpitations and leg swelling.   Gastrointestinal:  Negative for abdominal distention, abdominal pain, constipation, diarrhea, nausea and vomiting.   Endocrine: Negative for polyuria.   Genitourinary:  Negative for difficulty urinating, dysuria and hematuria.   Musculoskeletal:  Negative for arthralgias and back pain.        Left leg pain and swelling   Skin:  Negative for color change and rash.   Neurological:  Negative for dizziness, seizures, syncope, light-headedness and headaches.   Psychiatric/Behavioral:  Negative for confusion.    All other systems reviewed and are negative.      Physical Exam  ED Triage Vitals [06/18/24 2147]   Temperature Pulse Respirations Blood Pressure SpO2   98 °F (36.7 °C) 59 18 150/92 99 %      Temp Source Heart Rate Source Patient Position - Orthostatic VS BP Location FiO2 (%)   Oral Monitor Lying Left arm --      Pain Score       8             Orthostatic Vital Signs  Vitals:    06/19/24 0400 06/19/24 0615 06/19/24 0715 06/19/24 0800   BP: 164/96 143/83 150/93 (!) 155/106   Pulse: 56 (!) 50 (!) 54 56   Patient Position - Orthostatic VS:    Lying Lying       Physical Exam  Vitals and nursing note reviewed.   Constitutional:       General: She is not in acute distress.     Appearance: Normal appearance. She is not ill-appearing, toxic-appearing or diaphoretic.      Comments: Pt sitting comfortably on stretcher not in acute distress   HENT:      Head: Normocephalic and atraumatic.      Right Ear: External ear normal.      Left Ear: External ear normal.      Nose: Nose normal.      Mouth/Throat:      Mouth: Mucous membranes are moist.   Eyes:      General: No scleral icterus.        Right eye: No discharge.      Extraocular Movements: Extraocular movements intact.      Conjunctiva/sclera: Conjunctivae normal.   Cardiovascular:      Rate and Rhythm: Normal rate and regular rhythm.      Pulses: Normal pulses.      Heart sounds: No murmur heard.  Pulmonary:      Effort: Pulmonary effort is normal.      Breath sounds: Normal breath sounds. No wheezing.   Chest:      Chest wall: No tenderness.   Abdominal:      General: Abdomen is flat. There is no distension.      Palpations: Abdomen is soft.      Tenderness: There is no abdominal tenderness.   Musculoskeletal:         General: No deformity or signs of injury. Normal range of motion.      Cervical back: Normal range of motion and neck supple. No rigidity or tenderness.      Right lower leg: No edema.      Left lower leg: Edema present.        Legs:       Comments: Left leg with 2+ pitting edema. Erythematous, TTP and warm to touch. No drainage. ROM limited with knee flexion, pt can't flex left knee more than 50%.    Skin:     General: Skin is warm and dry.   Neurological:      General: No focal deficit present.      Mental Status: She is alert and oriented to person, place, and time.         ED Medications  Medications   vancomycin (VANCOCIN) IVPB (premix in dextrose) 1,000 mg 200 mL (0 mg Intravenous Stopped 6/19/24 0039)       Diagnostic Studies  Results Reviewed       Procedure Component Value Units  Date/Time    Procalcitonin [[id number]]  (Normal) Collected: 06/18/24 2316    Lab Status: Final result Specimen: Blood from Arm, Left Updated: 06/18/24 2354     Procalcitonin <0.05 ng/ml     Comprehensive metabolic panel [[id number]]  (Abnormal) Collected: 06/18/24 2316    Lab Status: Final result Specimen: Blood from Arm, Left Updated: 06/18/24 2346     Sodium 139 mmol/L      Potassium 3.9 mmol/L      Chloride 104 mmol/L      CO2 30 mmol/L      ANION GAP 5 mmol/L      BUN 20 mg/dL      Creatinine 0.96 mg/dL      Glucose 96 mg/dL      Calcium 8.8 mg/dL      AST 17 U/L      ALT 8 U/L      Alkaline Phosphatase 57 U/L      Total Protein 6.3 g/dL      Albumin 3.5 g/dL      Total Bilirubin 0.35 mg/dL      eGFR 68 ml/min/1.73sq m     Narrative:      National Kidney Disease Foundation guidelines for Chronic Kidney Disease (CKD):     Stage 1 with normal or high GFR (GFR > 90 mL/min/1.73 square meters)    Stage 2 Mild CKD (GFR = 60-89 mL/min/1.73 square meters)    Stage 3A Moderate CKD (GFR = 45-59 mL/min/1.73 square meters)    Stage 3B Moderate CKD (GFR = 30-44 mL/min/1.73 square meters)    Stage 4 Severe CKD (GFR = 15-29 mL/min/1.73 square meters)    Stage 5 End Stage CKD (GFR <15 mL/min/1.73 square meters)  Note: GFR calculation is accurate only with a steady state creatinine    C-reactive protein [[id number]]  (Abnormal) Collected: 06/18/24 2316    Lab Status: Final result Specimen: Blood from Arm, Left Updated: 06/18/24 2346     CRP 13.2 mg/L     CBC and differential [[id number]]  (Abnormal) Collected: 06/18/24 2316    Lab Status: Final result Specimen: Blood from Arm, Left Updated: 06/18/24 2330     WBC 5.04 Thousand/uL      RBC 3.67 Million/uL      Hemoglobin 11.6 g/dL      Hematocrit 34.2 %      MCV 93 fL      MCH 31.6 pg      MCHC 33.9 g/dL      RDW 13.1 %      MPV 10.7 fL      Platelets 291 Thousands/uL      nRBC 0 /100 WBCs      Segmented % 58 %      Immature Grans % 0 %      Lymphocytes % 30 %      Monocytes % 9 %       Eosinophils Relative 2 %      Basophils Relative 1 %      Absolute Neutrophils 2.97 Thousands/µL      Absolute Immature Grans 0.00 Thousand/uL      Absolute Lymphocytes 1.49 Thousands/µL      Absolute Monocytes 0.46 Thousand/µL      Eosinophils Absolute 0.09 Thousand/µL      Basophils Absolute 0.03 Thousands/µL     Sedimentation rate, automated [[id number]]  (Abnormal) Collected: 06/18/24 2316    Lab Status: Final result Specimen: Blood from Arm, Left Updated: 06/18/24 2326     Sed Rate 33 mm/hour                    XR knee 4+ vw left injury   Final Result by Tavo Hunter MD (06/19 0947)      Moderate size joint effusion. Swelling. Underlying osseous structures and joint spaces appear within normal limits, otherwise            Workstation performed: [id number]               Procedures  Procedures      ED Course                             SBIRT 20yo+      Flowsheet Row Most Recent Value   Initial Alcohol Screen: US AUDIT-C     1. How often do you have a drink containing alcohol? 0 Filed at: 06/18/2024 2149   2. How many drinks containing alcohol do you have on a typical day you are drinking?  0 Filed at: 06/18/2024 2149   3a. Male UNDER 65: How often do you have five or more drinks on one occasion? 0 Filed at: 06/18/2024 2149   3b. FEMALE Any Age, or MALE 65+: How often do you have 4 or more drinks on one occassion? 0 Filed at: 06/18/2024 2149   Audit-C Score 0 Filed at: 06/18/2024 2149   GUILHERME: How many times in the past year have you...    Used an illegal drug or used a prescription medication for non-medical reasons? Never Filed at: 06/18/2024 2149                  Medical Decision Making  53 year old female with PMHx of herpes zoster presents to the ED for evaluation of left leg pain and swelling over a week ago.     Symptoms consistent with cellulitis. Pt compliant with her abx and worsening of pain, swelling, and erythema. Will evaluate with cbc, cmp, procal, ESR, and CRP. Will give IV ab for fail  outpatient treatment. Labs and imaging discussed with pt who expressed understanding and agrees with plan for admission. Pt admitted to [facility].      Problems Addressed:  Cellulitis of right leg: acute illness or injury    Amount and/or Complexity of Data Reviewed  External Data Reviewed: labs.  Labs: ordered.    Risk  Prescription drug management.  Decision regarding hospitalization.          Disposition  Final diagnoses:   Cellulitis of right leg     Time reflects when diagnosis was documented in both MDM as applicable and the Disposition within this note       Time User Action Codes Description Comment    6/19/2024 11:32 AM Anthony Justin Add [S89.92XD] Injury of left knee, subsequent encounter     6/20/2024  6:43 AM Marcelino Kaminski Add [L03.115] Cellulitis of right leg           ED Disposition       ED Disposition   Admit    Condition   Stable    Date/Time   Wed Jun 19, 2024 0043    Comment   Case was discussed with SOD and the patient's admission status was agreed to be Admission Status: inpatient status to the service of SOD.               Follow-up Information       Follow up With Specialties Details Why Contact Info    Dianelys Penaloza MD Family Medicine Follow up  [address] [place] [address]  [phone]              Discharge Medication List as of 6/19/2024 11:58 AM        START taking these medications    Details   naproxen (NAPROSYN) 250 mg tablet Take 1 tablet (250 mg total) by mouth 2 (two) times a day with meals, Starting Wed 6/19/2024, Normal           CONTINUE these medications which have NOT CHANGED    Details   Ferrous Sulfate (IRON PO) Take by mouth, Historical Med      VITAMIN D PO Take by mouth, Historical Med           No discharge procedures on file.    PDMP Review       None             ED Provider  Attending physically available and evaluated Margaux Zaragoza. I managed the patient along with the ED Attending.    Electronically Signed by           Marcelino Kaminski DO  06/22/24  1174

## 2024-06-19 NOTE — ASSESSMENT & PLAN NOTE
P/w left knee pain effusion and pain x10 days after recently running a marathon earlier this month. No inciting trauma or injury  Seen at [facility] on 6/16/24 for this knee pain, venous duplex of LLE negative for DVT, pt started on keflex for ?cellulitis which she has been compliant with.     Pt returns to the ED with ongoing popliteal and patellar knee pain. On exam she has effusion around left knee with LLE swelling with increased warmth d/t swelling. No erythema, no open wounds. Pt afebrile without leukocytosis. ED requesting admission for worsening cellulitis (failed OP abx treatment) and received a dose of vancomycin in the ED    Exam: no midline tenderness, mild acetabular TTP, no IT band TTP, negative lachmans test, negative McMurrey test, + thessaly test, + patellar apprehension    Suspect patellofemoral syndrome vs meniscus injury     Plan:  No further antibiotics, low suspicion for cellulitis  Left knee xray  Ice/tylenol   OP referral to orthopedics, will likely need MRI, PT

## 2024-06-19 NOTE — H&P
INTERNAL MEDICINE RESIDENCY ADMISSION H&P     Name: Margaux Zaragoza   Age & Sex: 53 y.o. female   MRN: [id number]  Unit/Bed#: ED 20   Encounter: [id number]  Primary Care Provider: Dianelys Penaloza MD    Code Status: Level 1 - Full Code  Admission Status: OBSERVATION  Disposition: Patient requires Med/Surg    Admit to team: SOD Team C     ASSESSMENT/PLAN     Principal Problem:    Injury of left knee      * Injury of left knee  Assessment & Plan  P/w left knee pain effusion and pain x10 days after recently running a marathon earlier this month. No inciting trauma or injury  Seen at [facility] on 6/16/24 for this knee pain, venous duplex of LLE negative for DVT, pt started on keflex for ?cellulitis which she has been compliant with.     Pt returns to the ED with ongoing popliteal and patellar knee pain. On exam she has effusion around left knee with LLE swelling with increased warmth d/t swelling. No erythema, no open wounds. Pt afebrile without leukocytosis. ED requesting admission for worsening cellulitis (failed OP abx treatment) and received a dose of vancomycin in the ED    Exam: no midline tenderness, mild acetabular TTP, no IT band TTP, negative lachmans test, negative McMurrey test, + thessaly test, + patellar apprehension    Suspect patellofemoral syndrome vs meniscus injury     Plan:  No further antibiotics, low suspicion for cellulitis  Left knee xray  Ice/tylenol   OP referral to orthopedics, will likely need MRI, PT         VTE Pharmacologic Prophylaxis: Enoxaparin (Lovenox)  VTE Mechanical Prophylaxis: sequential compression device    CHIEF COMPLAINT     Chief Complaint   Patient presents with    Cellulitis     Pt reports being seen at [facility] on Sunday and was diagnosed with cellulitis on the left foot. Pt reports taking keflex and no improvement. Pts left foot is swollen.       HISTORY OF PRESENT ILLNESS     53-year-old female without past medical history.  She presents to the ED on 6/18 with ongoing left  leg pain and swelling.  She states that symptoms started on  when she noticed pain on the posterior aspect of the knee.  Over the next week pain continued and she noticed swelling around the knee and calf.  She was seen at [facility] ED on  where she underwent duplex which was negative for DVT and was ultimately discharged on Keflex with diagnosis of cellulitis.  She has been compliant with Keflex since then but swelling continued to spread so she came back to the ED today.  Upon arrival she is hemodynamically stable, BP mildly elevated.  CBC and CMP were unremarkable.  She does have mildly elevated ESR and CRP.  No new imaging obtained.  Patient denies any inciting injury or trauma.  She has no history of prior injury to this leg.  She denies systemic symptoms.  She does spend a lot of time outdoors but has not noticed any rash or tick bites.  She is also a marathon runner and ran a marathon earlier this month, but did not have any injuries or pain immediately following this.      REVIEW OF SYSTEMS     Review of Systems   Constitutional:  Negative for activity change, appetite change, chills, fatigue and fever.   Respiratory:  Negative for shortness of breath.    Cardiovascular:  Negative for chest pain.   Gastrointestinal:  Negative for abdominal pain, diarrhea, nausea and vomiting.   Musculoskeletal:  Positive for arthralgias and joint swelling.   Skin:  Negative for rash.   Hematological:  Does not bruise/bleed easily.   All other systems reviewed and are negative.    OBJECTIVE     Vitals:    24 2147 24 2300 24 0000 24 0100   BP: 150/92 148/89 140/86 148/97   BP Location: Left arm      Pulse: 59 62 (!) 49 64   Resp: 18 20 13 21   Temp: 98 °F (36.7 °C)      TempSrc: Oral      SpO2: 99% 97% 98% 99%      Temperature:   Temp (24hrs), Av °F (36.7 °C), Min:98 °F (36.7 °C), Max:98 °F (36.7 °C)    Temperature: 98 °F (36.7 °C)  Intake & Output:  I/O       None           Weights:        There is no height or weight on file to calculate BMI.  Weight (last 2 days)       None          Physical Exam  Vitals reviewed.   Constitutional:       General: She is not in acute distress.     Appearance: Normal appearance. She is not ill-appearing.   HENT:      Head: Normocephalic and atraumatic.      Right Ear: External ear normal.      Left Ear: External ear normal.      Nose: Nose normal.      Mouth/Throat:      Mouth: Mucous membranes are moist.   Eyes:      Conjunctiva/sclera: Conjunctivae normal.   Cardiovascular:      Rate and Rhythm: Normal rate and regular rhythm.   Pulmonary:      Effort: Pulmonary effort is normal. No respiratory distress.   Abdominal:      General: Abdomen is flat.   Musculoskeletal:         General: Swelling and tenderness present.      Left knee: Swelling and effusion present. No erythema or crepitus. Tenderness (popliteal fossa) present. No LCL laxity, MCL laxity, ACL laxity or PCL laxity.     Right lower leg: No edema.      Left lower leg: Edema present.      Comments: Mild pitting edema of left lower extremity below knee.  Effusion surrounding left knee.  Tenderness to palpation most significant in popliteal fossa.   Lymphadenopathy:      Lower Body: No left inguinal adenopathy.   Skin:     General: Skin is warm and dry.      Findings: No erythema, lesion, rash or wound.   Neurological:      Mental Status: She is alert and oriented to person, place, and time. Mental status is at baseline.      Cranial Nerves: No cranial nerve deficit.   Psychiatric:         Mood and Affect: Mood normal.         Behavior: Behavior normal.       PAST MEDICAL HISTORY     Past Medical History:   Diagnosis Date    Herpes zoster without complication 1/15/2019     PAST SURGICAL HISTORY     Past Surgical History:   Procedure Laterality Date    ARTHROSCOPIC REPAIR ACL Right     CERVICAL BIOPSY      COLONOSCOPY W/ POLYPECTOMY  01/03/2023    5 YEAR F/U; FAMILY HISTORY     SOCIAL &  "FAMILY HISTORY     Social History     Substance and Sexual Activity   Alcohol Use No       Social History     Substance and Sexual Activity   Drug Use No     Social History     Tobacco Use   Smoking Status Former    Types: Cigarettes   Smokeless Tobacco Never     Family History   Problem Relation Age of Onset    Stroke Mother     Coronary artery disease Mother     Colon cancer Father 65    No Known Problems Sister     No Known Problems Sister     No Known Problems Sister     No Known Problems Sister     No Known Problems Daughter     Thyroid cancer Daughter 28    No Known Problems Daughter     No Known Problems Daughter     No Known Problems Daughter     Colon cancer Paternal Grandfather     No Known Problems Son      LABORATORY DATA     Labs: I have personally reviewed pertinent reports.    Results from last 7 days   Lab Units 06/18/24  2316   WBC Thousand/uL 5.04   HEMOGLOBIN g/dL 11.6   HEMATOCRIT % 34.2*   PLATELETS Thousands/uL 291   SEGS PCT % 58   MONO PCT % 9   EOS PCT % 2      Results from last 7 days   Lab Units 06/18/24  2316   POTASSIUM mmol/L 3.9   CHLORIDE mmol/L 104   CO2 mmol/L 30   BUN mg/dL 20   CREATININE mg/dL 0.96   CALCIUM mg/dL 8.8   ALK PHOS U/L 57   ALT U/L 8   AST U/L 17                          Micro:  No results found for: \"BLOODCX\", \"URINECX\", \"WOUNDCULT\", \"SPUTUMCULTUR\"  IMAGING & DIAGNOSTIC TESTS     Imaging: I have personally reviewed pertinent reports.    No results found.  EKG, Pathology, and Other Studies: I have personally reviewed pertinent reports.     ALLERGIES   No Known Allergies  MEDICATIONS PRIOR TO ARRIVAL     Prior to Admission medications    Medication Sig Start Date End Date Taking? Authorizing Provider   Ferrous Sulfate (IRON PO) Take by mouth    Historical Provider, MD   VITAMIN D PO Take by mouth    Historical Provider, MD     MEDICATIONS ADMINISTERED IN LAST 24 HOURS     Medication Administration - last 24 hours from 06/18/2024 0135 to 06/19/2024 0135         " "Date/Time Order Dose Route Action Action by     06/19/2024 0039 EDT vancomycin (VANCOCIN) IVPB (premix in dextrose) 1,000 mg 200 mL 0 mg Intravenous Stopped Celine Gracia RN     06/18/2024 2328 EDT vancomycin (VANCOCIN) IVPB (premix in dextrose) 1,000 mg 200 mL 1,000 mg Intravenous New Bag Celine Gracia RN          CURRENT MEDICATIONS     Current Facility-Administered Medications   Medication Dose Route Frequency Provider Last Rate    acetaminophen  650 mg Oral Q6H PRN Shyanne Pierson, DO      enoxaparin  40 mg Subcutaneous Daily Shyanne Pierson, DO      naproxen  250 mg Oral BID PRN Shyanne Pierson, DO          acetaminophen, 650 mg, Q6H PRN  naproxen, 250 mg, BID PRN        Admission Time  I spent 45 minutes admitting the patient.  This involved direct patient contact where I performed a full history and physical, reviewing previous records, and reviewing laboratory and other diagnostic studies.    Portions of the record may have been created with voice recognition software.  Occasional wrong word or \"sound a like\" substitutions may have occurred due to the inherent limitations of voice recognition software.  Read the chart carefully and recognize, using context, where substitutions have occurred.    ==  Shyanne Pierson DO  [facility]  Internal Medicine Residency PGY-2    "

## 2024-06-19 NOTE — ED ATTENDING ATTESTATION
6/18/2024  ITina DO, saw and evaluated the patient. I have discussed the patient with the resident/non-physician practitioner and agree with the resident's/non-physician practitioner's findings, Plan of Care, and MDM as documented in the resident's/non-physician practitioner's note, except where noted. All available labs and Radiology studies were reviewed.  I was present for key portions of any procedure(s) performed by the resident/non-physician practitioner and I was immediately available to provide assistance.       At this point I agree with the current assessment done in the Emergency Department.  I have conducted an independent evaluation of this patient a history and physical is as follows:    53-year-old female presents with left leg pain and swelling started last week.  Patient states this started as pain that she felt behind her knee.  Pain worsened.  Was seen at [facility] 2 days ago where she had a negative duplex and was started on Keflex for cellulitis.  Patient states since that time swelling and redness has worsened.  Denies fevers.  Denies history of similar symptoms.  On exam-no acute distress, heart regular, no respiratory distress, edema in left lower extremity, skin is warm to touch with erythema.  Plan-concern for worsening infection.  DVT seems less likely given negative duplex on Sunday.  Will start vancomycin, check labs.  Suspect patient will be admitted for failure of outpatient therapy    ED Course         Critical Care Time  Procedures       Afebrile, hemodynamically stable, saturating well  NAD, well appearing  Head NCAT  EOMI grossly, anicteric  RRR, nml S1/S2, no m/r/g  Lungs CTAB with good air mvmt, no w/r/r  Abd soft, NT, ND, nml BS, no rebound or guarding  AAO, CN's 3-12 grossly intact  MATTHEWS spontaneously, no leg cyanosis or edema  Skin warm, well perfused, no rashes or hives

## 2024-06-19 NOTE — DISCHARGE SUMMARY
"      INTERNAL MEDICINE RESIDENCY DISCHARGE SUMMARY     Margaux Zaragoza   53 y.o. female  MRN: [id number]  Room/Bed: ED 20/ED 20     [facility] ED   Encounter: [id number]    Principal Problem:    Injury of left knee      * Injury of left knee  Assessment & Plan  P/w left knee pain effusion and pain x10 days after recently running a marathon earlier this month. No inciting trauma or injury  Seen at [facility] on 6/16/24 for this knee pain, venous duplex of LLE negative for DVT, pt started on keflex for ?cellulitis which she has been compliant with.     Pt returns to the ED with ongoing popliteal and patellar knee pain. On exam she has effusion around left knee with LLE swelling with increased warmth d/t swelling. No erythema, no open wounds. Pt afebrile without leukocytosis. ED requesting admission for worsening cellulitis (failed OP abx treatment) and received a dose of vancomycin in the ED    Exam: no midline tenderness, mild acetabular TTP, no IT band TTP, negative lachmans test, negative McMurrey test, + thessaly test, + patellar apprehension    Suspect patellofemoral syndrome vs meniscus injury     Plan:  No further antibiotics, low suspicion for cellulitis  Left knee xray  Ice/tylenol   OP referral to orthopedics, will likely need MRI, PT         DETAILS OF HOSPITAL COURSE     H&P per Dr. Pierson:  \"53-year-old female without past medical history. She presents to the ED on 6/18 with ongoing left leg pain and swelling. She states that symptoms started on 6/9 when she noticed pain on the posterior aspect of the knee. Over the next week pain continued and she noticed swelling around the knee and calf. She was seen at [facility] ED on 6/16 where she underwent duplex which was negative for DVT and was ultimately discharged on Keflex with diagnosis of cellulitis. She has been compliant with Keflex since then but swelling continued to spread so she came back to the " "ED today. Upon arrival she is hemodynamically stable, BP mildly elevated. CBC and CMP were unremarkable. She does have mildly elevated ESR and CRP. No new imaging obtained. Patient denies any inciting injury or trauma. She has no history of prior injury to this leg. She denies systemic symptoms. She does spend a lot of time outdoors but has not noticed any rash or tick bites. She is also a marathon runner and ran a marathon earlier this month, but did not have any injuries or pain immediately following this.\"    Patient was in no distress following admission.  Knee pain and swelling both improved after Naprosyn.  Given hemodynamic stability, lack of fever, very low concern for any infectious etiology, patient is safe to be discharged with close outpatient follow-up.  Discussed that she may need further imaging or potentially steroid injections depending on the etiology of her effusion, however she may have all of this done outside of the hospital.      She does note that she spends a good deal of time outdoors, as detailed above.  Was strawberry picking shortly before her symptoms began.  Will send Lyme serology and follow-up on results after she is discharged, and communicate with her PCP if needed.    Physical Exam  Constitutional:       Patient is alert, awake and in no acute distress.   HENT:      Normocephalic, atraumatic.      Mucous membranes are moist.      Airway patent.   Eyes:      PERRL, EOMI.     No scleral icterus.  Cardiovascular:      Normal rate and regular rhythm.     S1 and S2 present. No murmur heard. No friction rub. No gallop.   Pulmonary:      No respiratory distress or increased work of breathing. Breath sounds clear to auscultation bilaterally.    Abdominal:      Abdomen is soft, non-tender and non-distended. No tenderness, rebound or guarding. Bowel sounds present.   Musculoskeletal:      Neck supple with full ROM.     Moderate left knee effusion with minimal swelling extending inferior to " "the knee.  No significant bony or joint line tenderness.  Range of motion fully intact.  Able to bear weight. Distal pulses present.  Skin:     Skin is warm and dry.   Neurological:      Patient is oriented x3, mental status is at baseline.   Psychiatric:         Affect: normal      DISCHARGE INFORMATION     PCP at Discharge: Dianelys Penaloza MD    Admitting Provider: Harley Cason MD  Admission Date: 6/18/2024    Discharge Provider: Harley Cason MD  Discharge Date: 6/19/24    Discharge Disposition: Final discharge disposition not confirmed  Discharge Condition: good  Discharge with Lines: no    Discharge Diet: regular diet  Activity Restrictions: none  Test Results Pending at Discharge: none    Discharge Diagnoses:  Principal Problem:    Injury of left knee  Resolved Problems:    * No resolved hospital problems. *      Consulting Providers:      Diagnostic & Therapeutic Procedures Performed:  No results found.    Code Status: Level 1 - Full Code  Advance Directive & Living Will: <no information>  Power of :    POLST:      Medications:  Current Discharge Medication List        Current Discharge Medication List        Current Discharge Medication List        CONTINUE these medications which have NOT CHANGED    Details   Ferrous Sulfate (IRON PO) Take by mouth      VITAMIN D PO Take by mouth             Allergies:  No Known Allergies    FOLLOW-UP     PCP Outpatient Follow-up:  Dianelys Penaloza MD    Consulting Providers Follow-up:  None     Active Issues Requiring Follow-up:   Left knee pain/effusion, consider outpatient MRI     Discharge Statement:   I spent 30 minutes minutes discharging the patient. This time was spent on the day of discharge. I had direct contact with the patient on the day of discharge. Additional documentation is required if more than 30 minutes were spent on discharge.    Portions of the record may have been created with voice recognition software.  Occasional wrong word or \"sound a " "like\" substitutions may have occurred due to the inherent limitations of voice recognition software.  Read the chart carefully and recognize, using context, where substitutions have occurred.    ==  Anthony Justin MD  [facility]  Internal Medicine Resident PGY-1  "

## 2024-06-20 ENCOUNTER — TRANSITIONAL CARE MANAGEMENT (OUTPATIENT)
Dept: FAMILY MEDICINE CLINIC | Facility: CLINIC | Age: 54
End: 2024-06-20

## 2024-06-20 ENCOUNTER — TELEPHONE (OUTPATIENT)
Dept: OTHER | Facility: HOSPITAL | Age: 54
End: 2024-06-20

## 2024-06-20 DIAGNOSIS — A69.23 LYME ARTHRITIS (HCC): Primary | ICD-10-CM

## 2024-06-20 RX ORDER — DOXYCYCLINE HYCLATE 100 MG/1
100 CAPSULE ORAL EVERY 12 HOURS SCHEDULED
Qty: 56 CAPSULE | Refills: 0 | Status: SHIPPED | OUTPATIENT
Start: 2024-06-20 | End: 2024-07-18

## 2024-06-20 NOTE — TELEPHONE ENCOUNTER
Noted patient had positive Lyme IgG following discharge, negative IgM.  She does note after further questioning she had a possible insect bite behind her left ear also 1 week prior to beginning of symptoms.    Patient has no known prior history of tick bites or infection with Lyme disease.    Currently has an appointment with her primary care provider on 6/24/2024.  For now, will send prescription for doxycycline 100 mg twice daily for 28 days until she can follow-up with her PCP, to determine if antibiotic therapy should be continued.

## 2024-06-24 ENCOUNTER — OFFICE VISIT (OUTPATIENT)
Dept: FAMILY MEDICINE CLINIC | Facility: CLINIC | Age: 54
End: 2024-06-24
Payer: COMMERCIAL

## 2024-06-24 VITALS
HEIGHT: 68 IN | TEMPERATURE: 97.6 F | RESPIRATION RATE: 16 BRPM | OXYGEN SATURATION: 97 % | HEART RATE: 56 BPM | SYSTOLIC BLOOD PRESSURE: 134 MMHG | WEIGHT: 144 LBS | DIASTOLIC BLOOD PRESSURE: 82 MMHG | BODY MASS INDEX: 21.82 KG/M2

## 2024-06-24 DIAGNOSIS — K21.9 GASTROESOPHAGEAL REFLUX DISEASE WITHOUT ESOPHAGITIS: ICD-10-CM

## 2024-06-24 DIAGNOSIS — S89.92XD INJURY OF LEFT KNEE, SUBSEQUENT ENCOUNTER: ICD-10-CM

## 2024-06-24 DIAGNOSIS — M25.562 PAIN AND SWELLING OF LEFT KNEE: ICD-10-CM

## 2024-06-24 DIAGNOSIS — A69.23 LYME ARTHRITIS OF KNEE (HCC): Primary | ICD-10-CM

## 2024-06-24 DIAGNOSIS — Z00.01 ABNORMAL WELLNESS EXAM: ICD-10-CM

## 2024-06-24 DIAGNOSIS — M25.462 PAIN AND SWELLING OF LEFT KNEE: ICD-10-CM

## 2024-06-24 PROCEDURE — 99496 TRANSJ CARE MGMT HIGH F2F 7D: CPT | Performed by: FAMILY MEDICINE

## 2024-06-24 PROCEDURE — 99396 PREV VISIT EST AGE 40-64: CPT | Performed by: FAMILY MEDICINE

## 2024-06-24 RX ORDER — NAPROXEN 500 MG/1
500 TABLET ORAL 2 TIMES DAILY WITH MEALS
Qty: 60 TABLET | Refills: 0 | Status: SHIPPED | OUTPATIENT
Start: 2024-06-24

## 2024-06-24 RX ORDER — PANTOPRAZOLE SODIUM 40 MG/1
40 TABLET, DELAYED RELEASE ORAL
Qty: 30 TABLET | Refills: 1 | Status: SHIPPED | OUTPATIENT
Start: 2024-06-24

## 2024-06-24 NOTE — PROGRESS NOTES
Transition of Care Visit  Name: Margaux Zaragoza      : 1970      MRN: [id number]  Encounter Provider: Dianelys Penaloza MD  Encounter Date: 2024   Encounter department: [facility]    Assessment & Plan   1. Lyme arthritis of knee (HCC)  Assessment & Plan:  Continue doxycycline 100mg tab PO twice daily x 28 day course.   Follow-up with rheumatology.   Orders:  -     Ambulatory Referral to Rheumatology; Future  2. Pain and swelling of left knee  Assessment & Plan:  Continue doxycycline 100mg tab PO twice daily x 28 day course.   Follow-up with rheumatology.   Orders:  -     Ambulatory Referral to Rheumatology; Future  3. Injury of left knee, subsequent encounter  Assessment & Plan:  Continue naproxen 500mg tab PO twice daily.  Orders:  -     naproxen (NAPROSYN) 500 mg tablet; Take 1 tablet (500 mg total) by mouth 2 (two) times a day with meals  4. Gastroesophageal reflux disease without esophagitis  Assessment & Plan:  Start pantoprazole 40mg daily. Discussed potential side effects.  Orders:  -     pantoprazole (PROTONIX) 40 mg tablet; Take 1 tablet (40 mg total) by mouth daily before breakfast  5. Abnormal wellness exam  Assessment & Plan:  It was discussed about immunizations, diet, exercise and safety measures.         History of Present Illness     Transitional Care Management Review:   Margaux Zaragoza is a 53 y.o. female here for TCM follow up.     During the TCM phone call patient stated:  TCM Call     Date and time call was made  2024  2:17 PM    Hospital care reviewed  Records reviewed    Patient was hospitialized at  [facility]    Date of Admission  24    Date of discharge  24    Diagnosis  Injury of left knee    Disposition  Home    Were the patients medications reviewed and updated  No      TCM Call     Post hospital issues  None    Should patient be enrolled in anticoag monitoring?  No    Scheduled for follow up?  Yes    Did you obtain  your prescribed medications  Yes    Do you need help managing your prescriptions or medications  No    Is transportation to your appointment needed  No    I have advised the patient to call PCP with any new or worsening symptoms  Maribell Chacon RN    Are you recieving any outpatient services  No    Are you recieving home care services  No    Are you using any community resources  No    Current waiver services  No    Have you fallen in the last 12 months  No    Interperter language line needed  No        54 y/o female presenting to office for transition of care and physical exam. She was in the hospital 6/16 and 6/18 due to left leg swelling. ED originally thought it was cellulitis and she was given Keflex but continued to worsen in swelling. She returned to ED and they thought it may be injury related however her lyme IgG was positive and she was sent doxycycline 100mg PO twice daily. She does report she picked her ear and saw a black speck after the swelling began which she thinks could have been tick but not entirely sure. She denies ever having a classic bullseye rash that appeared -- she is active outdoors and was strawberry picking prior to swelling as well. She has been taking this for the past 4 days but was advised to discuss if she should continue this. She has also been taking naproxen. She states there has been very slow improvement. Swelling still present but patient feels her ROM has slightly improved, mildly tender at certain points.      Review of Systems   Constitutional:  Negative for chills and fever.   HENT:  Negative for ear pain and sore throat.    Eyes:  Negative for pain and visual disturbance.   Respiratory:  Negative for cough and shortness of breath.    Cardiovascular:  Negative for chest pain and palpitations.   Gastrointestinal:  Negative for abdominal pain and vomiting.   Genitourinary:  Negative for dysuria and hematuria.   Musculoskeletal:  Positive for arthralgias and joint swelling.  "Negative for back pain.   Skin:  Negative for color change and rash.   Neurological:  Negative for seizures and syncope.   All other systems reviewed and are negative.    Objective     /82 (BP Location: Left arm, Patient Position: Sitting, Cuff Size: Adult)   Pulse 56   Temp 97.6 °F (36.4 °C) (Tympanic)   Resp 16   Ht 5' 8\" (1.727 m)   Wt 65.3 kg (144 lb)   SpO2 97%   BMI 21.90 kg/m²     Physical Exam  Vitals and nursing note reviewed.   Constitutional:       General: She is not in acute distress.     Appearance: She is well-developed.   HENT:      Head: Normocephalic and atraumatic.   Eyes:      Conjunctiva/sclera: Conjunctivae normal.   Cardiovascular:      Rate and Rhythm: Normal rate and regular rhythm.      Heart sounds: No murmur heard.  Pulmonary:      Effort: Pulmonary effort is normal. No respiratory distress.      Breath sounds: Normal breath sounds.   Abdominal:      Palpations: Abdomen is soft.      Tenderness: There is no abdominal tenderness.   Musculoskeletal:         General: Swelling and tenderness present.      Cervical back: Neck supple.      Comments: Left leg with edema present from upper thigh down to left foot. Most prominent swelling surrounding knee with 1+ pitting edema. Mild tenderness surrounding posterior knee. Mildly limited ROM of flexion/extension due to swelling.    Skin:     General: Skin is warm and dry.      Capillary Refill: Capillary refill takes less than 2 seconds.   Neurological:      Mental Status: She is alert.   Psychiatric:         Mood and Affect: Mood normal.       Medications have been reviewed by provider in current encounter    Administrative Statements     "

## 2024-06-24 NOTE — PROGRESS NOTES
Transition of Care Visit  Name: Margaux Zaragoza      : 1970      MRN: [id number]  Encounter Provider: Dianelys Penaloza MD  Encounter Date: 2024   Encounter department: [facility]    Assessment & Plan   1. Lyme arthritis of knee (HCC)  Assessment & Plan:  Continue doxycycline 100mg tab PO twice daily x 28 day course.   Follow-up with rheumatology.   Orders:  -     Ambulatory Referral to Rheumatology; Future  2. Pain and swelling of left knee  Assessment & Plan:  Continue doxycycline 100mg tab PO twice daily x 28 day course.   Follow-up with rheumatology.   Orders:  -     Ambulatory Referral to Rheumatology; Future  3. Injury of left knee, subsequent encounter  Assessment & Plan:  Continue naproxen 500mg tab PO twice daily.  Orders:  -     naproxen (NAPROSYN) 500 mg tablet; Take 1 tablet (500 mg total) by mouth 2 (two) times a day with meals  4. Gastroesophageal reflux disease without esophagitis  Assessment & Plan:  Start pantoprazole 40mg daily. Discussed potential side effects.  Orders:  -     pantoprazole (PROTONIX) 40 mg tablet; Take 1 tablet (40 mg total) by mouth daily before breakfast  5. Abnormal wellness exam  Assessment & Plan:  It was discussed about immunizations, diet, exercise and safety measures.         History of Present Illness   {Disappearing Hyperlinks I Encounters * My Last Note * Since Last Visit * History :92303}  Transitional Care Management Review:   Margaux Zaragoza is a 53 y.o. female here for TCM follow up.     During the TCM phone call patient stated:  TCM Call     Date and time call was made  2024  2:17 PM    Hospital care reviewed  Records reviewed    Patient was hospitialized at  [facility]    Date of Admission  24    Date of discharge  24    Diagnosis  Injury of left knee    Disposition  Home    Were the patients medications reviewed and updated  No      TCM Call     Post hospital issues  None    Should patient be  "enrolled in anticoag monitoring?  No    Scheduled for follow up?  Yes    Did you obtain your prescribed medications  Yes    Do you need help managing your prescriptions or medications  No    Is transportation to your appointment needed  No    I have advised the patient to call PCP with any new or worsening symptoms  Maribell Chacon RN    Are you recieving any outpatient services  No    Are you recieving home care services  No    Are you using any community resources  No    Current waiver services  No    Have you fallen in the last 12 months  No    Interperter language line needed  No        HPI  Review of Systems  Objective   {Disappearing Hyperlinks   Review Vitals * Enter New Vitals * Results Review * Labs * Imaging * Cardiology * Procedures * Lung Cancer Screening :01134}  /82 (BP Location: Left arm, Patient Position: Sitting, Cuff Size: Adult)   Pulse 56   Temp 97.6 °F (36.4 °C) (Tympanic)   Resp 16   Ht 5' 8\" (1.727 m)   Wt 65.3 kg (144 lb)   SpO2 97%   BMI 21.90 kg/m²     Physical Exam  Medications have been reviewed by provider in current encounter    Administrative Statements {Disappearing Hyperlinks I  Level of Service * PCMH/PCSP:34460}  {Time Spent Statement (Optional):93889}      "

## 2024-06-25 ENCOUNTER — LAB (OUTPATIENT)
Dept: LAB | Facility: HOSPITAL | Age: 54
End: 2024-06-25
Attending: INTERNAL MEDICINE
Payer: COMMERCIAL

## 2024-06-25 ENCOUNTER — CONSULT (OUTPATIENT)
Dept: RHEUMATOLOGY | Facility: CLINIC | Age: 54
End: 2024-06-25
Payer: COMMERCIAL

## 2024-06-25 VITALS
WEIGHT: 144 LBS | SYSTOLIC BLOOD PRESSURE: 122 MMHG | HEIGHT: 68 IN | DIASTOLIC BLOOD PRESSURE: 68 MMHG | BODY MASS INDEX: 21.82 KG/M2

## 2024-06-25 DIAGNOSIS — M25.562 PAIN AND SWELLING OF LEFT KNEE: ICD-10-CM

## 2024-06-25 DIAGNOSIS — M25.462 PAIN AND SWELLING OF LEFT KNEE: ICD-10-CM

## 2024-06-25 DIAGNOSIS — A69.23 LYME ARTHRITIS OF KNEE (HCC): ICD-10-CM

## 2024-06-25 DIAGNOSIS — M02.362 REACTIVE ARTHRITIS OF LEFT KNEE (HCC): Primary | ICD-10-CM

## 2024-06-25 LAB
CRYSTALS SNV QL MICRO: NORMAL
LYMPHOCYTES # SNV MANUAL: 7 %
MONOCYTES NFR SNV MANUAL: 6 %
NEUTROPHILS NFR SNV MANUAL: 87 %
SITE: NORMAL
TOTAL CELLS COUNTED SPEC: 100
WBC # FLD MANUAL: NORMAL /UL

## 2024-06-25 PROCEDURE — 89051 BODY FLUID CELL COUNT: CPT

## 2024-06-25 PROCEDURE — 89060 EXAM SYNOVIAL FLUID CRYSTALS: CPT

## 2024-06-25 PROCEDURE — 87476 LYME DIS DNA AMP PROBE: CPT

## 2024-06-25 PROCEDURE — 99204 OFFICE O/P NEW MOD 45 MIN: CPT | Performed by: INTERNAL MEDICINE

## 2024-06-25 PROCEDURE — 20610 DRAIN/INJ JOINT/BURSA W/O US: CPT | Performed by: INTERNAL MEDICINE

## 2024-06-25 PROCEDURE — 87070 CULTURE OTHR SPECIMN AEROBIC: CPT

## 2024-06-25 PROCEDURE — 87205 SMEAR GRAM STAIN: CPT

## 2024-06-25 NOTE — PATIENT INSTRUCTIONS
"Continue naproxen 500mg twice a day  Do labs  Left knee fluid withdrawn  Will contact you regarding steroid course  Continue doxycycline twice a day for 4 weeks    Return to clinic in 6 months    Reactive Arthritis    What is reactive arthritis? -- Reactive arthritis is a kind of arthritis that happens after certain infections. It causes pain and swelling in joints.  Reactive arthritis usually affects people who have or just had:  ?Food poisoning or another kind of infection of the intestines  ?An infection that you catch through sex   In the past, reactive arthritis was sometimes called \"Vicki syndrome.\"  What are the symptoms of reactive arthritis? -- The main symptoms of reactive arthritis are pain and swelling in the joints. These usually happen 1 to 4 weeks after an infection. In most cases, the symptoms affect only a few joints, usually in the knees, ankles, or feet.  Other symptoms might include:  ?Pain in the tendons in the feet and ankles (tendons are tough bands of tissue that connect muscles to bones)  ?Irritation of the eye called \"conjunctivitis\" (also known as pink eye)  ?Pain when urinating  Is there a test for reactive arthritis? -- No. There is no test. But if your doctor or nurse can figure out what type of germ caused your infection, he or she should be able to tell if you have reactive arthritis. Your doctor or nurse can test your stool (bowel movements) or urine to look for certain kinds of germs.  If your doctor or nurse can't tell what germs caused your infection, he or she will study your symptoms to decide how likely it is that you have reactive arthritis.  How is reactive arthritis treated? -- The symptoms of reactive arthritis are treated with medicines, including:  ?NSAIDs - This is a large group of medicines that includes ibuprofen (sample brand names: Advil, Motrin) and naproxen (sample brand name: Aleve). Your doctor or nurse might prescribe a dose higher than you would normally take " to relieve pain.  ?Other medicines - If NSAIDs do not help your symptoms, your doctor or nurse might give you a shot of steroids. Steroids help reduce inflammation. These are not the same as the steroids some athletes take illegally. Your doctor might also give you more steroids to take at home.  There are also other medicines that might help if your symptoms do not get better with NSAIDs or steroids.  ?Eye drops - Special drops can help relieve redness and irritation in your eyes. But if you have eye pain or trouble seeing, visit an eye doctor to make sure you don't have a more serious problem.  Antibiotics do not usually help with the joint symptoms of reactive arthritis. Even so, your doctor or nurse might prescribe them if you still have an infection.  When will I feel better? -- Most people with reactive arthritis get better quickly. Some people continue to notice symptoms, either constantly or just once in a while.  If your back gets very stiff and sore, see your doctor or nurse. This might mean that your reactive arthritis has turned into a more serious problem

## 2024-06-25 NOTE — PROGRESS NOTES
RHEUMATOLOGY NEW PATIENT NOTE    Assessment and Plan:   Margaux Zaragoza is a 53 y.o.  female who presents for evaluation of possible Lyme arthritis, but it actually seems more to be due to reactive arthritis. Lyme PCR of left knee synovial fluid was negative.  Patient's synovial fluid WBC count was greater than 18,000, consistent with an inflammatory process.  Synovial fluid culture and crystal was negative.  Patient likely has reactive arthritis rather than an infectious arthritis, Lyme arthritis, or gout. Patient does not remember ever having Lyme disease or having a tick on her.  Assessment & Plan  1. Suspected Lyme arthritis, is less likely.  The patient's IgM test yielded negative results, however, the IgG test returned positive results. The differential diagnosis includes Lyme arthritis, infectious arthritis, inflammatory arthritis, reactive arthritis, gout, or pseudogout. A fluid sample will be sent for analysis. The patient is advised to continue taking naproxen 500 mg twice daily with meals. A 7-day course of steroids will be prescribed. Laboratory tests will be ordered.    Continue naproxen 500mg twice a day until flare resolves  Do labs  Left knee fluid withdrawn, 35 mL  Will contact patient regarding steroid course  Continue doxycycline twice a day for 4 weeks    Return to clinic in 6 months    Plan:  Diagnoses and all orders for this visit:    Reactive arthritis of left knee (HCC)  -     methylPREDNISolone 4 MG tablet therapy pack; Use as directed on package    Pain and swelling of left knee  -     Ambulatory Referral to Rheumatology  -     Synovial fluid white cell count w/ diff; Future  -     Body fluid culture and Gram stain; Future  -     Lyme disease, PCR; Future  -     Synovial fluid, crystal; Future  -     Cyclic citrul peptide antibody, IgG  -     RF Screen w/ Reflex to Titer  -     HLA-B27 antigen  -     Large joint arthrocentesis: L knee    Lyme arthritis of knee (HCC)  -     Ambulatory  "Referral to Rheumatology  -     Synovial fluid white cell count w/ diff; Future  -     Body fluid culture and Gram stain; Future  -     Lyme disease, PCR; Future  -     Synovial fluid, crystal; Future    Large joint arthrocentesis: L knee  Universal Protocol:  Consent: Verbal consent obtained. Written consent not obtained.  Risks and benefits: risks, benefits and alternatives were discussed  Consent given by: patient  Time out: Immediately prior to procedure a \"time out\" was called to verify the correct patient, procedure, equipment, support staff and site/side marked as required.  Timeout called at: 6/25/2024 2:00 PM.  Patient understanding: patient states understanding of the procedure being performed  Patient consent: the patient's understanding of the procedure matches consent given  Procedure consent: procedure consent matches procedure scheduled  Relevant documents: relevant documents present and verified  Test results: test results available and properly labeled  Site marked: the operative site was marked  Radiology Images displayed and confirmed. If images not available, report reviewed: imaging studies available  Required items: required blood products, implants, devices, and special equipment available  Patient identity confirmed: verbally with patient  Supporting Documentation  Indications: pain, joint swelling and diagnostic evaluation   Procedure Details  Location: knee - L knee  Preparation: Patient was prepped and draped in the usual sterile fashion  Needle size: 25 G  Ultrasound guidance: no  Approach: lateral    Aspirate amount: 35 mL  Aspirate: serous and yellow  Analysis: fluid sample sent for laboratory analysis  Patient tolerance: patient tolerated the procedure well with no immediate complications  Dressing:  Sterile dressing applied    After discussing the risks/benefits of left knee aspiration, including minor risk of infection, bleeding, pain, or ineffectiveness, informed consent was " obtained and patient was agreeable to proceed.  Using sterile technique, the left knee was prepped with Chloraprep, and was topically anesthetized with Ethyl Chloride. The joint was entered using a lateral approach and 35 ml's of yellow colored fluid was withdrawn and sent for Lyme PCR, cell count, crystals, gram stain, and culture.  The procedure was well tolerated.  Patient was instructed to contact our office with any concerns or questions.         Follow-up plan: RTC in 6 months         Chief Complaint  Left knee pain and swelling      YONATAN Zaragoza is a 53 y.o.  female who presents for new patient evaluation.    History of Present Illness  The patient is a 53-year-old female who presents for evaluation of possible inflammatory arthritis.    The patient was previously diagnosed with Lyme arthritis of the knee during a hospital visit the previous week. She first noticed the symptoms on 06/09/2023, which progressively worsened, prompting her to seek emergency care one week later. The pain, initially localized behind the left knee, was radiating down the calf, leading to difficulty in ambulation. She was diagnosed with cellulitis and initiated on Keflex. However, after two days, the pain worsened, extending to the ankle, and above the knee. She sought a second emergency department on Tuesday night, where she was administered IV antibiotics due to a suspected resistance to Keflex. During her stay, her internal medicine ruled out cellulitis and attributed the pain to injury. The orthopedic team diagnosed her with a knee effusion due to overuse and discharged her. Following her discharge, she was tested for Lyme disease and was subsequently diagnosed with Lyme arthritis. This is her first symptom of Lyme disease, although she recalls a tick on her ear several years ago, which was excised without a bull's eye rash. She did not experience any symptoms such as arthritis, rash, or fatigue at that time. The onset  of her symptoms was on 06/09/2023, which she attributes to overextension of the knee while unloading a . She successfully completed a marathon on 06/01/2023 without any complications. A sonogram was performed during her first ER visit due to concerns of a blood clot, which revealed spontaneous fluid accumulation in the muscle. She noticed a black anupama on her ear, which appeared as a piercing bite, but no redness or pain. She used a band for tendinitis, which worsened her symptoms. She took a rest day and completed a 40-minute run on Thursday morning, which improved her knee swelling. However, sitting exacerbates her knee swelling, which worsens later in the day. Elevating her knee provides some relief. At rest, her knee is not painful, but her range of motion is limited. Her symptoms have progressively worsened daily. She is currently on naproxen 500 mg twice daily, which has improved her symptoms. She is also on a 4-week course of doxycycline, which she started on Wednesday. She has not been prescribed prednisone. She denies any recent illness or viral infections.   She drinks beer rarely. She eats seafood on occasion.   Her sister has Ankylosing spondylitis.      Review of Systems:   See HPI    Home Medications:    Current Outpatient Medications:     doxycycline hyclate (VIBRAMYCIN) 100 mg capsule, Take 1 capsule (100 mg total) by mouth every 12 (twelve) hours for 28 days, Disp: 56 capsule, Rfl: 0    Ferrous Sulfate (IRON PO), Take by mouth, Disp: , Rfl:     methylPREDNISolone 4 MG tablet therapy pack, Use as directed on package, Disp: 21 tablet, Rfl: 0    naproxen (NAPROSYN) 500 mg tablet, Take 1 tablet (500 mg total) by mouth 2 (two) times a day with meals, Disp: 60 tablet, Rfl: 0    pantoprazole (PROTONIX) 40 mg tablet, Take 1 tablet (40 mg total) by mouth daily before breakfast (Patient taking differently: Take 40 mg by mouth daily before breakfast PRN), Disp: 30 tablet, Rfl: 1    VITAMIN D PO, Take  "by mouth, Disp: , Rfl:     Objective:    Vitals:    06/25/24 1350   BP: 122/68   Weight: 65.3 kg (144 lb)   Height: 5' 8\" (1.727 m)       Physical Exam  Constitutional:       General: She is not in acute distress.  HENT:      Head: Normocephalic and atraumatic.   Eyes:      Conjunctiva/sclera: Conjunctivae normal.   Cardiovascular:      Rate and Rhythm: Normal rate and regular rhythm.      Heart sounds: S1 normal and S2 normal.      No friction rub.   Pulmonary:      Effort: Pulmonary effort is normal. No respiratory distress.      Breath sounds: Normal breath sounds. No wheezing, rhonchi or rales.   Musculoskeletal:         General: Swelling present.      Cervical back: Neck supple.      Comments: Left knee swelling and warmth   Skin:     General: Skin is warm and dry.      Coloration: Skin is not pale.      Findings: No rash.   Neurological:      Mental Status: She is alert. Mental status is at baseline.   Psychiatric:         Mood and Affect: Mood normal.         Behavior: Behavior normal.       Physical Exam      Reviewed labs and imaging.    Imaging:   XR knee 4+ vw left injury    Result Date: 6/19/2024  Narrative: XR KNEE 4+ VW LEFT INJURY INDICATION: knee effusion. COMPARISON: None FINDINGS: No acute fracture or dislocation. Moderate size joint effusion noted. No significant degenerative changes. No lytic or blastic osseous lesion. There seems to be some swelling of the knee anteriorly.     Impression: Moderate size joint effusion. Swelling. Underlying osseous structures and joint spaces appear within normal limits, otherwise Workstation performed: [id number]     VAS VENOUS DUPLEX LOWER EXTREMITY LEFT    Result Date: 6/16/2024  Narrative: Clinical history: Leg swelling and pain. Comparison: No previous left lower extremity venous duplex ultrasound. Comment: B-mode two-dimensional vascular, Doppler spectral analysis, and color flow Doppler imaging ultrasound examination of the left lower extremity from the " common femoral vein to the popliteal fossa, as well as limited evaluation of the calf veins, was performed. Color-flow and compressibility were within normal limits in the common femoral, femoral, and popliteal veins, without evidence of thrombus. Pulsed Doppler evaluation showed spontaneous and phasic flow. The visualized portions of the left profunda femoris vein, and the left calf vein are grossly patent. Well-defined mildly complex fluid collection seen within the left calf approximately 2 to 4 cm deep to the skin surface measures 3.6 x 2.3 x 1.7 cm in diameter, without color Doppler flow. Although nonspecific this may reflect a small hematoma. Screening views of the right common femoral vein show no evidence of thrombus.    Impression: Impression: No DVT within the visualized veins of the left lower extremity. Small mildly complex fluid collection within the left calf as described. Workstation:[id number]      Labs:   Lab on 06/25/2024   Component Date Value Ref Range Status    Site 06/25/2024 SYNOVIAL   Final    WBC, Fluid 06/25/2024 18,724  /ul Final    Body Fluid Culture, Sterile 06/25/2024 No growth   Final    Gram Stain Result 06/25/2024 3+ Polys   Final    Gram Stain Result 06/25/2024 No bacteria seen   Final    Lyme Disease(B.burgdorferi)PCR 06/25/2024 Negative  Negative Final    No B. burgdorferi DNA Detected.  This test was developed and its performance characteristics determined  by [email or web address].  It has not been cleared or approved by the Food and Drug  Administration.  The FDA has determined that such clearance or  approval is not necessary.    Crystals, Synovial Fluid 06/25/2024 No Crystals Seen  No Crystals Seen Final    Total Counted 06/25/2024 100   Final    Neutrophil % Synovial 06/25/2024 87  % Final    Lymph % Synovial 06/25/2024 7  % Final    Monocyte % Synovial 06/25/2024 6  % Final   Consult on 06/25/2024   Component Date Value Ref Range Status    Cyclic Citrullinated Peptide Ab  06/26/2024 0.7  See  comment Final    Rheumatoid Factor 06/26/2024 Negative  Negative Final   Admission on 06/18/2024, Discharged on 06/19/2024   Component Date Value Ref Range Status    Ventricular Rate 06/18/2024 56  BPM Final    Atrial Rate 06/18/2024 56  BPM Final    AL Interval 06/18/2024 154  ms Final    QRSD Interval 06/18/2024 84  ms Final    QT Interval 06/18/2024 426  ms Final    QTC Interval 06/18/2024 411  ms Final    P Axis 06/18/2024 47  degrees Final    QRS Axis 06/18/2024 40  degrees Final    T Wave Axis 06/18/2024 35  degrees Final    WBC 06/18/2024 5.04  4.31 - 10.16 Thousand/uL Final    RBC 06/18/2024 3.67 (L)  3.81 - 5.12 Million/uL Final    Hemoglobin 06/18/2024 11.6  11.5 - 15.4 g/dL Final    Hematocrit 06/18/2024 34.2 (L)  34.8 - 46.1 % Final    MCV 06/18/2024 93  82 - 98 fL Final    MCH 06/18/2024 31.6  26.8 - 34.3 pg Final    MCHC 06/18/2024 33.9  31.4 - 37.4 g/dL Final    RDW 06/18/2024 13.1  11.6 - 15.1 % Final    MPV 06/18/2024 10.7  8.9 - 12.7 fL Final    Platelets 06/18/2024 291  149 - 390 Thousands/uL Final    nRBC 06/18/2024 0  /100 WBCs Final    Segmented % 06/18/2024 58  43 - 75 % Final    Immature Grans % 06/18/2024 0  0 - 2 % Final    Lymphocytes % 06/18/2024 30  14 - 44 % Final    Monocytes % 06/18/2024 9  4 - 12 % Final    Eosinophils Relative 06/18/2024 2  0 - 6 % Final    Basophils Relative 06/18/2024 1  0 - 1 % Final    Absolute Neutrophils 06/18/2024 2.97  1.85 - 7.62 Thousands/µL Final    Absolute Immature Grans 06/18/2024 0.00  0.00 - 0.20 Thousand/uL Final    Absolute Lymphocytes 06/18/2024 1.49  0.60 - 4.47 Thousands/µL Final    Absolute Monocytes 06/18/2024 0.46  0.17 - 1.22 Thousand/µL Final    Eosinophils Absolute 06/18/2024 0.09  0.00 - 0.61 Thousand/µL Final    Basophils Absolute 06/18/2024 0.03  0.00 - 0.10 Thousands/µL Final    Sodium 06/18/2024 139  135 - 147 mmol/L Final    Potassium 06/18/2024 3.9  3.5 - 5.3 mmol/L Final    Chloride 06/18/2024 104  96 - 108 mmol/L Final    CO2  06/18/2024 30  21 - 32 mmol/L Final    ANION GAP 06/18/2024 5  4 - 13 mmol/L Final    BUN 06/18/2024 20  5 - 25 mg/dL Final    Creatinine 06/18/2024 0.96  0.60 - 1.30 mg/dL Final    Standardized to IDMS reference method    Glucose 06/18/2024 96  65 - 140 mg/dL Final    If the patient is fasting, the ADA then defines impaired fasting glucose as > 100 mg/dL and diabetes as > or equal to 123 mg/dL.    Calcium 06/18/2024 8.8  8.4 - 10.2 mg/dL Final    AST 06/18/2024 17  13 - 39 U/L Final    ALT 06/18/2024 8  7 - 52 U/L Final    Specimen collection should occur prior to Sulfasalazine administration due to the potential for falsely depressed results.     Alkaline Phosphatase 06/18/2024 57  34 - 104 U/L Final    Total Protein 06/18/2024 6.3 (L)  6.4 - 8.4 g/dL Final    Albumin 06/18/2024 3.5  3.5 - 5.0 g/dL Final    Total Bilirubin 06/18/2024 0.35  0.20 - 1.00 mg/dL Final    Use of this assay is not recommended for patients undergoing treatment with eltrombopag due to the potential for falsely elevated results.  N-acetyl-p-benzoquinone imine (metabolite of Acetaminophen) will generate erroneously low results in samples for patients that have taken an overdose of Acetaminophen.    eGFR 06/18/2024 68  ml/min/1.73sq m Final    Procalcitonin 06/18/2024 <0.05  <=0.25 ng/ml Final    Comment: Suspected Lower Respiratory Tract Infection (LRTI):  - LESS than or EQUAL to 0.25 ng/mL:   low likelihood for bacterial LRTI; antibiotics DISCOURAGED.  - GREATER than 0.25 ng/mL:   increased likelihood for bacterial LRTI; antibiotics ENCOURAGED.    Suspected Sepsis:  - Strongly consider initiating antibiotics in ALL UNSTABLE patients.  - LESS than or EQUAL to 0.5 ng/mL:   low likelihood for bacterial sepsis; antibiotics DISCOURAGED.  - GREATER than 0.5 ng/mL:   increased likelihood for bacterial sepsis; antibiotics ENCOURAGED.  - GREATER than 2 ng/mL:   high risk for severe sepsis / septic shock; antibiotics strongly ENCOURAGED.    Decisions  on antibiotic use should not be based solely on Procalcitonin (PCT) levels. If PCT is low but uncertainty exists with stopping antibiotics, repeat PCT in 6-24 hours to confirm the low level. If antibiotics are administered (regardless if initial PCT was high or low), repeat PCT every 1-2 days to consider early antibiotic cessation (when GREATER                            than 80% decrease from the peak OR when PCT drops below designated cutoffs, whichever comes first), so long as the infection is NOT one that typically requires prolonged treatment durations (e.g., bone/joint infections, endocarditis, Staph. aureus bacteremia).    Situations of FALSE-POSITIVE Procalcitonin values:  1) Newborns < 72 hours old  2) Massive stress from severe trauma / burns, major surgery, acute pancreatitis, cardiogenic / hemorrhagic shock, sickle cell crisis, or other organ perfusion abnormalities  3) Malaria and some Candidal infections  4) Treatment with agents that stimulate cytokines (e.g., OKT3, anti-lymphocyte globulins, alemtuzumab, IL-2, granulocyte transfusion [NOT GCSFs])  5) Chronic renal disease causes elevated baseline levels (consider GREATER than 0.75 ng/mL as an abnormal cut-off); initiating HD/CRRT may cause transient decreases  6) Paraneoplastic syndromes from medullary thyroid or SCLC, some forms of vasculitis, and acute [id number]                            disease    Situations of FALSE-NEGATIVE Procalcitonin values:  1) Too early in clinical course for PCT to have reached its peak (may repeat in 6-24 hours to confirm low level)  2) Localized infection WITHOUT systemic (SIRS / sepsis) response (e.g., an abscess, osteomyelitis, cystitis)  3) Mycobacteria (e.g., Tuberculosis, MAC)  4) Cystic fibrosis exacerbations      Sed Rate 06/18/2024 33 (H)  0 - 29 mm/hour Final    CRP 06/18/2024 13.2 (H)  <3.0 mg/L Final    Lyme Total Antibodies 06/19/2024 Positive (A)  Negative Final    Positive Lyme Total Ab test is  reflexed to IGM and IGG as part of Modified Two-Tier Testing (MTT).    Lyme IGM 06/19/2024 Negative  Negative Final    Antibodies to B. burgdorferi (Lyme disease) not confirmed.   Negative results may occur in patients recently infected (<=14 days) with   B. burgdorferi. If recent infection is suspected, repeat testing on a new   sample collected in 7-14 days is recommended.    Lyme IGG 06/19/2024 Positive (A)  Negative Final    Results are consistent with B. burgdorferi (Lyme disease) infection in the recent or remote past.   IgG-class antibodies may remain detectable for months to years following resolution of infection.   Results should not be used to monitor or establish adequate response to therapy. Response to therapy is  confirmed through resolution of clinical symptoms. If test is equivocal, consider repeat testing in 7-14  days if clinically warranted.   Appointment on 02/03/2024   Component Date Value Ref Range Status    WBC 02/03/2024 3.63 (L)  4.31 - 10.16 Thousand/uL Final    RBC 02/03/2024 4.06  3.81 - 5.12 Million/uL Final    Hemoglobin 02/03/2024 12.5  11.5 - 15.4 g/dL Final    Hematocrit 02/03/2024 39.3  34.8 - 46.1 % Final    MCV 02/03/2024 97  82 - 98 fL Final    MCH 02/03/2024 30.8  26.8 - 34.3 pg Final    MCHC 02/03/2024 31.8  31.4 - 37.4 g/dL Final    RDW 02/03/2024 13.6  11.6 - 15.1 % Final    MPV 02/03/2024 10.9  8.9 - 12.7 fL Final    Platelets 02/03/2024 297  149 - 390 Thousands/uL Final    nRBC 02/03/2024 0  /100 WBCs Final    Segmented % 02/03/2024 52  43 - 75 % Final    Immature Grans % 02/03/2024 0  0 - 2 % Final    Lymphocytes % 02/03/2024 35  14 - 44 % Final    Monocytes % 02/03/2024 9  4 - 12 % Final    Eosinophils Relative 02/03/2024 3  0 - 6 % Final    Basophils Relative 02/03/2024 1  0 - 1 % Final    Absolute Neutrophils 02/03/2024 1.93  1.85 - 7.62 Thousands/µL Final    Absolute Immature Grans 02/03/2024 0.01  0.00 - 0.20 Thousand/uL Final    Absolute Lymphocytes 02/03/2024 1.26   0.60 - 4.47 Thousands/µL Final    Absolute Monocytes 02/03/2024 0.31  0.17 - 1.22 Thousand/µL Final    Eosinophils Absolute 02/03/2024 0.09  0.00 - 0.61 Thousand/µL Final    Basophils Absolute 02/03/2024 0.03  0.00 - 0.10 Thousands/µL Final    Sodium 02/03/2024 142  135 - 147 mmol/L Final    Potassium 02/03/2024 3.9  3.5 - 5.3 mmol/L Final    Chloride 02/03/2024 104  96 - 108 mmol/L Final    CO2 02/03/2024 30  21 - 32 mmol/L Final    ANION GAP 02/03/2024 8  mmol/L Final    BUN 02/03/2024 13  5 - 25 mg/dL Final    Creatinine 02/03/2024 0.83  0.60 - 1.30 mg/dL Final    Standardized to IDMS reference method    Glucose, Fasting 02/03/2024 70  65 - 99 mg/dL Final    Calcium 02/03/2024 9.5  8.4 - 10.2 mg/dL Final    AST 02/03/2024 32  13 - 39 U/L Final    ALT 02/03/2024 24  7 - 52 U/L Final    Specimen collection should occur prior to Sulfasalazine administration due to the potential for falsely depressed results.     Alkaline Phosphatase 02/03/2024 74  34 - 104 U/L Final    Total Protein 02/03/2024 7.1  6.4 - 8.4 g/dL Final    Albumin 02/03/2024 4.1  3.5 - 5.0 g/dL Final    Total Bilirubin 02/03/2024 0.57  0.20 - 1.00 mg/dL Final    Use of this assay is not recommended for patients undergoing treatment with eltrombopag due to the potential for falsely elevated results.  N-acetyl-p-benzoquinone imine (metabolite of Acetaminophen) will generate erroneously low results in samples for patients that have taken an overdose of Acetaminophen.    eGFR 02/03/2024 81  ml/min/1.73sq m Final    Cholesterol 02/03/2024 179  See Comment mg/dL Final    Cholesterol:         Pediatric <18 Years        Desirable          <170 mg/dL      Borderline High    170-199 mg/dL      High               >=200 mg/dL        Adult >=18 Years            Desirable         <200 mg/dL      Borderline High   200-239 mg/dL      High              >239 mg/dL      Triglycerides 02/03/2024 42  See Comment mg/dL Final    Triglyceride:     0-9Y            <75mg/dL      10Y-17Y         <90 mg/dL       >=18Y     Normal          <150 mg/dL     Borderline High 150-199 mg/dL     High            200-499 mg/dL        Very High       >499 mg/dL    Specimen collection should occur prior to Metamizole administration due to the potential for falsely depressed results.    HDL, Direct 02/03/2024 85  >=50 mg/dL Final    LDL Calculated 02/03/2024 86  0 - 100 mg/dL Final    LDL Cholesterol:     Optimal           <100 mg/dl     Near Optimal      100-129 mg/dl     Above Optimal       Borderline High 130-159 mg/dl       High            160-189 mg/dl       Very High       >189 mg/dl         This screening LDL is a calculated result.   It does not have the accuracy of the Direct Measured LDL in the monitoring of patients with hyperlipidemia and/or statin therapy.   Direct Measure LDL (BIJ415) must be ordered separately in these patients.    TSH 3RD GENERATON 02/03/2024 1.487  0.450 - 4.500 uIU/mL Final    The recommended reference ranges for TSH during pregnancy are as follows:   First trimester 0.100 to 2.500 uIU/mL   Second trimester  0.200 to 3.000 uIU/mL   Third trimester 0.300 to 3.000 uIU/m    Note: Normal ranges may not apply to patients who are transgender, non-binary, or whose legal sex, sex at birth, and gender identity differ.  Adult TSH (3rd generation) reference range follows the recommended guidelines of the [employer], January, 2020.

## 2024-06-26 ENCOUNTER — APPOINTMENT (OUTPATIENT)
Dept: LAB | Facility: IMAGING CENTER | Age: 54
End: 2024-06-26
Payer: COMMERCIAL

## 2024-06-26 LAB — RHEUMATOID FACT SER QL LA: NEGATIVE

## 2024-06-26 PROCEDURE — 86430 RHEUMATOID FACTOR TEST QUAL: CPT | Performed by: INTERNAL MEDICINE

## 2024-06-26 PROCEDURE — 86200 CCP ANTIBODY: CPT | Performed by: INTERNAL MEDICINE

## 2024-06-26 PROCEDURE — 36415 COLL VENOUS BLD VENIPUNCTURE: CPT | Performed by: INTERNAL MEDICINE

## 2024-06-28 LAB
BACTERIA SPEC BFLD CULT: NO GROWTH
GRAM STN SPEC: NORMAL
GRAM STN SPEC: NORMAL

## 2024-06-29 LAB
B BURGDOR DNA SPEC QL NAA+PROBE: NEGATIVE
CCP AB SER IA-ACNC: 0.7

## 2024-06-30 RX ORDER — METHYLPREDNISOLONE 4 MG/1
TABLET ORAL
Qty: 21 TABLET | Refills: 0 | Status: SHIPPED | OUTPATIENT
Start: 2024-06-30

## 2024-09-16 ENCOUNTER — TELEPHONE (OUTPATIENT)
Age: 54
End: 2024-09-16

## 2024-09-16 NOTE — TELEPHONE ENCOUNTER
[address] [place] [address]                                OPERATIVE REPORT    PATIENT NAME: Janis Sanderson                   :        1948  MED REC NO:   [id number]                           ROOM:       9233  ACCOUNT NO:   [de-identified]                           ADMIT DATE: 2018  PROVIDER:     Tavo Rashid MD    DATE OF PROCEDURE:  2018    PREOPERATIVE DIAGNOSIS:  Biliary colic. POSTOPERATIVE DIAGNOSIS:  Biliary colic. OPERATION PERFORMED:  Laparoscopy with laparoscopic cholecystectomy. SURGEON:  Tavo Mcconnell MD    ANESTHESIA:  General.    COMPLICATIONS:  None. INDICATION FOR PROCEDURE:  The patient is a 31-year-old white female  with biliary colic symptoms with known sludge, who also has significant  medical issues and has had multiple prior abdominal surgeries, including  hernia repairs with mesh. It was felt that it was unlikely this  gallbladder would be able to be removed through the scope, but it was  felt that at least an attempt to place the scope in the left upper  quadrant to view the abdominal wall would be a reasonable thing to do. FINDINGS:  We did place a scope in the left abdominal wall, an Optiview  scope, to guide into the abdominal cavity safely. There was clearly a  lot of adhesions including the transverse colon attached up in the  epigastric area. However, I was able to get the scope over to an area  roughly where the umbilicus would be. I was able to place another port  safely under direct visualization. We then put the camera through this  area. I was able to look to the right of the adhesions, they were in  the epigastrium. I was able to place two 5 mm port and then I saw an  area opening in the epigastric just to the right of the falciform where  I was able to place another port.   At this point in time, I was able to  view the gallbladder and we were able to get Patient states she has been drinking apple juice for 4 days that is now on a recall. She states the specific size bottle she has is not on the recall list but the brand is. She has been experiencing a cough, sore throat, nausea, headache, body aches, chills and diarrhea.  She is asking if this could be related to the recall or if something else could be going on. She does feel better today and just has some slight sore throat with diarrhea. No appts available today.   it out safely. OPERATIVE PROCEDURE:  The patient was brought into the operating suite,  placed supine on the operating room table. After adequate inhalational  anesthesia was administered, the patient's abdomen was prepped and  draped in the usual sterile fashion. An attempt was made to place the  Veress needle in the upper midline, but I could not get good  insufflation pressures, and so I made a cut in the left upper quadrant,  and using an Optiview trocar, I was able to place the camera safely into  the abdominal cavity without any evidence of injury to the bowel. There  was clearly adhesions of omentum that we were facing as we attempted to  look into the epigastric area, but down inferior in the mid abdomen  there was an opening, roughly where the umbilicus would be, but her  umbilicus had previously been removed. I was able to place a port  there, then I took my camera from the left upper quadrant, placed  through this port, and there were clearly adhesions right in the midline  that I was able to look to the right of these adhesions, view the liver  and there were areas in the lateral abdominal wall where I was able to  place two 5 mm ports. We could see the liver, no evidence of  gallbladder at this time and I was able to find an opening in the  epigastric area to the right of the falciform ligament, where I placed  another port under direct visualization. We were then able to lift up  the edge of the liver, which was clearly enlarged from fatty  infiltration, but I saw what I felt to be the dome of the gallbladder,  we grasped it with one of the [facility] retractors, elevated, there  was clearly adhesions up, and with electrocautery and with blunt  dissection, I was able to pull these adhesions down. We were then able  to grasp this very floppy gallbladder at its neck, began dissecting in  this area, and identified the cystic duct.   I placed two clips on the  common duct side of the cystic

## 2024-10-11 ENCOUNTER — OFFICE VISIT (OUTPATIENT)
Dept: FAMILY MEDICINE CLINIC | Facility: CLINIC | Age: 54
End: 2024-10-11
Payer: COMMERCIAL

## 2024-10-11 VITALS
OXYGEN SATURATION: 99 % | RESPIRATION RATE: 16 BRPM | WEIGHT: 142 LBS | HEIGHT: 68 IN | BODY MASS INDEX: 21.52 KG/M2 | DIASTOLIC BLOOD PRESSURE: 88 MMHG | SYSTOLIC BLOOD PRESSURE: 130 MMHG | TEMPERATURE: 97 F | HEART RATE: 61 BPM

## 2024-10-11 DIAGNOSIS — H53.2 DIPLOPIA: Primary | ICD-10-CM

## 2024-10-11 PROCEDURE — 99213 OFFICE O/P EST LOW 20 MIN: CPT | Performed by: NURSE PRACTITIONER

## 2024-10-11 NOTE — ASSESSMENT & PLAN NOTE
- Referred to Ophthalmologist.   - Will continue to follow up.     Orders:    Ambulatory Referral to Ophthalmology; Future

## 2024-10-11 NOTE — PROGRESS NOTES
Ambulatory Visit  Name: Margaux Zaragoza      : 1970      MRN: [id number]  Encounter Provider: SAVANNAH Aguilar  Encounter Date: 10/11/2024   Encounter department: [facility] [address] [facility]    Assessment & Plan  Diplopia  - Referred to Ophthalmologist.   - Will continue to follow up.     Orders:    Ambulatory Referral to Ophthalmology; Future         History of Present Illness     Patient presents to office today with complaints of double vision. States that she believes its been happening for about 1 year but has been noticing it more over the last week. Notices it more when she is looking towards the left. States the other day she was talking to someone and it looked like they had 4 eyes. Also saw someone in her peripheral vision and it looked like there was 2 of them. Double vision is not associated with any type of headache. She does wear contacts. Has tried changing between monovision and multifocal which has not helped. She denies any other concerns or complaints.           Review of Systems   Constitutional:  Negative for fatigue and fever.   HENT:  Negative for trouble swallowing.    Eyes:  Positive for visual disturbance.   Respiratory:  Negative for cough and shortness of breath.    Cardiovascular:  Negative for chest pain and palpitations.   Gastrointestinal:  Negative for abdominal pain and blood in stool.   Endocrine: Negative for cold intolerance and heat intolerance.   Genitourinary:  Negative for difficulty urinating and dysuria.   Musculoskeletal:  Negative for gait problem.   Skin:  Negative for rash.   Neurological:  Negative for dizziness, syncope and headaches.   Hematological:  Negative for adenopathy.   Psychiatric/Behavioral:  Negative for behavioral problems.      Past Medical History:   Diagnosis Date    Gastroesophageal reflux disease without esophagitis 2024    Herpes zoster without complication 1/15/2019     Past Surgical History:   Procedure Laterality  Date    ARTHROSCOPIC REPAIR ACL Right     CERVICAL BIOPSY      COLONOSCOPY W/ POLYPECTOMY  01/03/2023    5 YEAR F/U; FAMILY HISTORY     Family History   Problem Relation Age of Onset    Stroke Mother     Coronary artery disease Mother     Colon cancer Father 65    No Known Problems Sister     No Known Problems Sister     No Known Problems Sister     No Known Problems Sister     No Known Problems Daughter     Thyroid cancer Daughter 28    No Known Problems Daughter     No Known Problems Daughter     No Known Problems Daughter     Colon cancer Paternal Grandfather     No Known Problems Son      Social History     Tobacco Use    Smoking status: Former     Types: Cigarettes    Smokeless tobacco: Never   Vaping Use    Vaping status: Never Used   Substance and Sexual Activity    Alcohol use: No    Drug use: No    Sexual activity: Not on file     Current Outpatient Medications on File Prior to Visit   Medication Sig    Ferrous Sulfate (IRON PO) Take by mouth    VITAMIN D PO Take by mouth    [DISCONTINUED] methylPREDNISolone 4 MG tablet therapy pack Use as directed on package    [DISCONTINUED] naproxen (NAPROSYN) 500 mg tablet Take 1 tablet (500 mg total) by mouth 2 (two) times a day with meals    [DISCONTINUED] pantoprazole (PROTONIX) 40 mg tablet Take 1 tablet (40 mg total) by mouth daily before breakfast (Patient taking differently: Take 40 mg by mouth daily before breakfast PRN)     No Known Allergies  Immunization History   Administered Date(s) Administered    COVID-19 J&J ([employer]) vaccine 0.5 mL 04/13/2021    COVID-19 MODERNA VACC 0.25 ML IM BOOSTER 12/14/2021    COVID-19 MODERNA VACC 0.5 ML IM 12/14/2021    Hep B, adult 12/03/2019, 01/06/2020, 06/05/2020    INFLUENZA 10/20/2018    Influenza, injectable, quadrivalent, preservative free 0.5 mL 11/21/2019    Tdap 02/14/2019    Tuberculin Skin Test-PPD Intradermal 12/03/2019, 12/10/2019, 04/12/2022     Objective     /88 (BP Location: Left arm, Patient Position:  "Sitting, Cuff Size: Adult)   Pulse 61   Temp (!) 97 °F (36.1 °C) (Tympanic)   Resp 16   Ht 5' 8\" (1.727 m)   Wt 64.4 kg (142 lb)   SpO2 99%   BMI 21.59 kg/m²     Physical Exam  Vitals and nursing note reviewed.   Constitutional:       Appearance: Normal appearance. She is well-developed.   HENT:      Head: Normocephalic and atraumatic.      Right Ear: External ear normal.      Left Ear: External ear normal.   Eyes:      Extraocular Movements: Extraocular movements intact.      Conjunctiva/sclera: Conjunctivae normal.      Pupils: Pupils are equal, round, and reactive to light.   Cardiovascular:      Rate and Rhythm: Normal rate and regular rhythm.      Heart sounds: Normal heart sounds.   Pulmonary:      Effort: Pulmonary effort is normal.      Breath sounds: Normal breath sounds.   Musculoskeletal:         General: Normal range of motion.      Cervical back: Normal range of motion.   Skin:     General: Skin is warm and dry.   Neurological:      Mental Status: She is alert and oriented to person, place, and time.   Psychiatric:         Mood and Affect: Mood normal.         Behavior: Behavior normal.         " details… detailed exam ROM intact/normal strength/no joint swelling/no joint erythema/no joint warmth/no calf tenderness

## 2024-10-24 NOTE — TELEPHONE ENCOUNTER
"  Internal Medicine - Daily Progress Note   Hospital Day: 7       Name:Mesfin Paiz, AGE: 45 y.o., GENDER: male, MRN: [id number], ROOM: 230/230-A   CODE STATUS: DNR and No Intubation  Attending Physician: Prosper Osorio DO  Resident: Dolores Kyle MD        Chief Complaint     Chief Complaint   Patient presents with    Weakness, Gen     A welfare check was called for patient by family stating that he has not gotten out of bed or eaten in two months. When deputies arrived, pt was found in bed covered in urine and feces and pt has been spitting on the walls at home. When pt was asked why he hadnt gotten out of bed he states \"because I am dying\" pt has a history of depression        Subjective    Mesfin Paiz is a 45 y.o. year old male patient on Hospital Day: 7    Overnight events: No acute events overnight.    Patient seen and examined at bedside this morning. He was resting comfortably in bed. States he is doing \"terrible\" but does not elaborate. He is much more conversational with examiner this morning. States he would like to be left alone to die, when questioned as to why he states \"I have been scared since May when a demon flew into my neck\". States he is in pain but when asked to explain he states \"it's hard to explain\". He has been sipping on different fluids that are at bedside, does have Kcl at bedside but states he does not like the taste. Explained we can always give them IV and he states that he will consider this. He does agree to a physical examination today.      Meds    Scheduled medications  enoxaparin, 40 mg, subcutaneous, q24h  folic acid, 1 mg, oral, Daily  multivitamin with minerals, 1 tablet, oral, Daily  OLANZapine zydis, 5 mg, oral, Nightly  OLANZapine zydis, 5 mg, oral, Once  potassium chloride, 20 mEq, intravenous, Once  sennosides-docusate sodium, 2 tablet, oral, BID  tamsulosin, 0.4 mg, oral, Daily  thiamine, 100 mg, oral, Daily      Continuous medications     PRN medications  PRN " Received respirator certification form  Pt states she was given the form and toldthe PCP can fill out, not employee health  Pt informed she needs to complete her Question portion then the provider will review and complete his portion  "medications: albuterol, hydrOXYzine HCL, LORazepam, trimethobenzamide     Objective    Physical Exam  Constitutional:       General: He is not in acute distress.     Comments: Patient is disheveled and malodorous    HENT:      Head: Normocephalic and atraumatic.      Mouth/Throat:      Mouth: Mucous membranes are moist.      Pharynx: Oropharynx is clear.   Cardiovascular:      Rate and Rhythm: Normal rate and regular rhythm.      Heart sounds: Normal heart sounds. No murmur heard.     No friction rub. No gallop.   Pulmonary:      Effort: Pulmonary effort is normal. No respiratory distress.      Breath sounds: No stridor. No wheezing or rales.   Abdominal:      General: Abdomen is flat. There is no distension.      Palpations: Abdomen is soft.      Tenderness: There is no abdominal tenderness. There is no guarding.   Musculoskeletal:      Right lower leg: No edema.      Left lower leg: No edema.   Skin:     General: Skin is warm and dry.   Neurological:      Mental Status: He is alert.   Psychiatric:         Attention and Perception: He is inattentive.         Mood and Affect: Affect is labile.         Behavior: Behavior is agitated.         Thought Content: Thought content is paranoid and delusional.        Visit Vitals  /72   Pulse 88   Temp 35.9 °C (96.6 °F)   Resp 14   Ht 1.854 m (6' 1\")   Wt 105 kg (230 lb 13.2 oz)   SpO2 96%   BMI 30.45 kg/m²   Smoking Status Never   BSA 2.33 m²        Intake/Output Summary (Last 24 hours) at 10/24/2024 1138  Last data filed at 10/24/2024 0900  Gross per 24 hour   Intake 1503 ml   Output --   Net 1503 ml       Labs:   Results from last 72 hours   Lab Units 10/24/24  0625 10/23/24  0632 10/22/24  0656   SODIUM mmol/L 138 138 136   POTASSIUM mmol/L 2.6* 3.0* 2.9*   CHLORIDE mmol/L 100 98 100   CO2 mmol/L 28 30 28   BUN mg/dL 6 7 7   CREATININE mg/dL 0.50 0.53 0.51   GLUCOSE mg/dL 95 97 110*   CALCIUM mg/dL 7.5* 8.2* 7.8*   ANION GAP mmol/L 13 13 11   EGFR mL/min/1.73m*2 >90 " ">uL 234 243 267      Lab Results   Component Value Date    CALCIUM 7.5 (L) 10/24/2024    PHOS 3.7 10/24/2024      Lab Results   Component Value Date    CRP 9.71 (H) 10/19/2024       [unfilled]     Micro/ID:   Susceptibility data from last 90 days.  Collected Specimen Info Organism   10/18/24 Blood culture from Peripheral Venipuncture Staphylococcus pettenkoferi                    No lab exists for component: \"AGALPCRNB\"     Lab Results   Component Value Date    BLOODCULT No growth at 2 days 10/21/2024       Images    ECG 12 lead    Result Date: 10/21/2024  Sinus tachycardia ST & T wave abnormality, consider inferolateral ischemia Abnormal ECG When compared with ECG of 18-OCT-2024 13:25, (unconfirmed) Minimal criteria for Inferior infarct are no longer Present    US gallbladder    Result Date: 10/20/2024  Interpreted By:  Amy Pardo, STUDY: US GALLBLADDER; 4:59 am   INDICATION: Gallstones on CT.   COMPARISON: CT examination done 1 day earlier   ACCESSION NUMBER(S): [id number]   ORDERING CLINICIAN: RA PÉREZ   TECHNIQUE: Limited abdominal ultrasound of the right upper quadrant was performed utilizing gray scale imaging.   FINDINGS: Liver: Normal echogenicity without focal lesion. No intrahepatic biliary distention. Hepatic size is normal. Gallbladder: Gallbladder wall is normal in thickness with no pericholecystic fluid. Cholelithiasis is seen. There is also echogenic sludge filling the gallbladder lumen. Sonographic Mann's sign: Negative Pancreas: Pancreas is obscured from visualization by overlying bowel gas. CBD: 0.1 cm   Right kidney is of normal-size measuring 12.3 cm in length. No hydronephrosis of the right kidney is present.       Sludge filled " [id number]    CT angio chest abdomen pelvis    Result Date: 10/18/2024  Interpreted By:  Portillo Fuentes and Guirguis James STUDY: CT ANGIO CHEST ABDOMEN PELVIS; 10/18/2024 4:24 pm   INDICATION: Signs/Symptoms:r/o mesenteric ischemia. [id number]   COMPARISON: CT abdomen pelvis dated 05/16/2024 CT chest dated 08/02/2024 and 05/16/2024   ACCESSION NUMBER(S): [id number]   ORDERING CLINICIAN: RA PÉREZ   PROCEDURE: CT ANGIOGRAM OF THE CHEST, ABDOMEN, & PELVIS   TECHNIQUE: High-resolution contrast-enhanced helical CT of the abdomen, & pelvis was performed, without contrast, timed to arterial phase, and timed to venous phase (three phases). High-resolution contrast-enhanced helical CT of the chest was performed, timed to the venous phase (5 minute delay). 3-D processing was performed by the physician on an independent work station, with MIP and volume-rendering techniques. Total of 75 ml of omnipaque 350 was injected intravenously during the examination. The study was performed without oral contrast. The patient tolerated the injection without complications.   FINDINGS: VASCULAR:   Pulmonary Arteries: No discrete acute pulmonary embolism, however, the study is not optimized to evaluate for pulmonary emboli.   Thoracic Aorta: No thoracic aortic aneurysm or dissection. No significant atherosclerosis. Anatomic pattern of great vessels off the arch is normal.   Images of the abdominal aorta demonstrate normal caliber vessel with no significant focal stenosis or aneurysm formation.   Celiac artery demonstrates no significant focal stenosis.   Superior mesenteric artery demonstrates no significant focal stenosis.   Inferior mesenteric artery demonstrates no  significant focal stenosis.   There is a single right renal artery. Right renal artery demonstrates no significant focal stenosis.   There is a single left renal artery. Left renal artery demonstrates no significant focal stenosis.   Right common iliac artery  is patent with no significant stenosis. Right external iliac artery is patent with no significant stenosis. Right internal iliac artery is patent with no significant stenosis. Right common femoral artery is patent with no significant stenosis. The visualized right profunda femoris artery is patent with no significant stenosis. The visualized right superficial femoral artery is patent with no significant stenosis.   Left common iliac artery is patent with no significant stenosis. Left external iliac artery is patent with no significant stenosis. Left internal iliac artery is patent with no significant stenosis. Left common femoral artery is patent with no significant stenosis. The visualized left profunda femoris artery is patent with no significant stenosis. The visualized left superficial femoral artery is patent with no significant stenosis.   NONVASCULAR FINDINGS:   CHEST: LUNG/PLEURA/LARGE AIRWAYS: Redemonstration of a right upper lobe pulmonary bleb within associated 3 mm nodule, unchanged when compared to the prior exam dated 08/02/2024. Otherwise, no lung masses, pulmonary nodules or consolidations are present. There is dependent bibasilar atelectasis. There is no pleural effusion, no pneumothorax.   HEART: The heart is normal in size. There is no pericardial effusion.   MEDIASTINUM AND JONO: No mediastinal, hilar or axillary lymph nodes are present. The esophagus appears normal.   CHEST WALL AND LOWER NECK: The chest wall is within normal limits. The visualized thyroid gland appears within normal limits.   ABDOMEN:   LIVER: The liver is normal in size without evidence of focal liver lesions.   BILE DUCTS: The intrahepatic and extrahepatic ducts are not  recent exam, however, likely represent multiple gas-filled gallstones.   PANCREAS: The pancreas appears unremarkable without evidence of ductal dilatation or masses.   SPLEEN: The spleen is normal in size without focal lesions.   ADRENAL GLANDS: Bilateral adrenal glands appear normal.   KIDNEYS AND URETERS: The kidneys are normal in size and enhance symmetrically. No hydroureteronephrosis or nephroureterolithiasis is identified.   PELVIS:   BLADDER: The urinary bladder appears normal without abnormal wall thickening.   REPRODUCTIVE ORGANS: The prostate is not enlarged. Note is made of intra-parenchymal prostatic calcifications.   BOWEL: The stomach is unremarkable. The small and large bowel are normal in caliber and demonstrate no wall thickening. Note is made of large rectosigmoid stool burden. The appendix appears normal.   VESSELS: The IVC, portal vein, SMV, and splenic vein are patent without evidence of thrombosis.   PERITONEUM/RETROPERITONEUM/LYMPH NODES: There is no free or loculated fluid collection, no free intraperitoneal air. The retroperitoneum appears normal. No abdominopelvic lymphadenopathy is present.   BONES AND ABDOMINAL WALL: No suspicious osseous lesions are identified. Degenerative discogenic disease is noted in the lower thoracic and lumbar spine. The abdominal wall soft tissues appear normal.       1. No evidence of acute mesenteric ischemia, acute vascular cut off, or flow-limiting stenosis. No aneurysmal dilatation or dissection. 2. Multifocal locules of hypodensities within the gallbladder lumen are noted, likely representing cholesterol gallstones. No evidence of cholecystitis. 3. No acute thoracoabdominopelvic process   I personally  reviewed the image(s) / study and the interpretation of Kale Hitchcock MD. I agree with the findings as stated. This study was interpreted at [facility].   MACRO: None   Signed by: Portillo Fuentes 10/18/2024 8:53 PM Dictation workstation:   [id number]    CT cervical spine wo IV contrast    Result Date: 10/18/2024  Interpreted By:  Giuseppe Wright, STUDY: CT CERVICAL SPINE WO IV CONTRAST; 10/18/2024 4:24 pm   INDICATION: Signs/Symptoms:found down for weeks with no PO;   COMPARISON: None   ACCESSION NUMBER(S): [id number]   ORDERING CLINICIAN: RA PÉREZ   TECHNIQUE: Contiguous axial images were acquired from the skull base to the lung apices. Coronal and sagittal reformatted images were obtained. All CT examinations are performed with 1 or more of the following dose reduction techniques: Automated exposure control, adjustment of mA and/or kv according to patient's size, or use of iterative reconstruction techniques.   FINDINGS: There is straightening of the normal cervical lordosis.  No acute fracture or spondylolisthesis is identified.     The occipital condyles, arch of C1, and the odontoid processes are intact. The atlantoaxial relationship is well maintained.   The intervertebral disc spaces appear adequately maintained with only minimal narrowing seen. There is suggestion of minimal-mild disc bulging which is difficult to assess on CT examination. No evidence for high-grade bony spinal canal stenosis.   Mild right neural foramina stenosis at C3-4. Moderate-severe right neural foramina stenosis at C4-5. At least moderate right neural foramina stenosis at C5-6.   The visualized lung apices are unremarkable.       1. No acute fracture or spondylolisthesis. 2. Degenerative changes as described in the body of the report.     Signed by: Giuseppe Wright 10/18/2024 5:22 PM Dictation workstation:   [id number]    CT lumbar spine wo IV contrast    Result Date:  [id number], [id number] ORDERING CLINICIAN: RA PÉREZ TECHNIQUE:  CT of the thoracic spine and lumbar spine was performed without intravenous or intrathecal contrast.  Sagittal and coronal reconstructions were generated.  Automated mA/kV exposure control was utilized and patient examination was performed in strict accordance with principles of ALARA. FINDINGS: Thoracic spine: The alignment is anatomic.  There is no fracture or traumatic subluxation. The vertebral body heights are well maintained.  Disc spaces are preserved.  No significant central canal stenosis is demonstrated. The neural foramina are patent throughout.  The paravertebral soft tissues are within normal limits.  The visualized chest and abdomen are unremarkable. Lumbar spine: The alignment is anatomic.  There is no fracture or traumatic subluxation. The vertebral body heights are well maintained.  Disc spaces are preserved.  No significant central canal stenosis is demonstrated. The neural foramina are patent throughout.  The paravertebral soft tissues are within normal limits.  The visualized abdomen is unremarkable.    Unremarkable thoracic and lumbar spine. Signed by Marvin Louise MD    CT thoracic spine wo IV contrast    Result Date: [id number], [id number] ORDERING CLINICIAN: RA PÉREZ TECHNIQUE:  CT of the thoracic spine and lumbar spine was performed without intravenous or intrathecal contrast.  Sagittal and coronal reconstructions were generated.   Automated mA/kV exposure control was utilized and patient examination was performed in strict accordance with principles of ALARA. FINDINGS: Thoracic spine: The alignment is anatomic.  There is no fracture or traumatic subluxation. The vertebral body heights are well maintained.  Disc spaces are preserved.  No significant central canal stenosis is demonstrated. The neural foramina are patent throughout.  The paravertebral soft tissues are within normal limits.  The visualized chest and abdomen are unremarkable. Lumbar spine: The alignment is anatomic.  There is no fracture or traumatic subluxation. The vertebral body heights are well maintained.  Disc spaces are preserved.  No significant central canal stenosis is demonstrated. The neural foramina are patent throughout.  The paravertebral soft tissues are within normal limits.  The visualized abdomen is unremarkable.    Unremarkable thoracic and lumbar spine. Signed by Marvin Louise MD    CT head wo IV contrast    Result Date: 10/18/2024  Interpreted By:  Giuseppe Wright, STUDY: RA PÉREZ; 10/18/2024 4:24 pm   INDICATION: Signs/Symptoms:found down for weeks with no PO;   COMPARISON: 05/16/2024   ACCESSION NUMBER(S): [id number]   ORDERING CLINICIAN: RA PÉREZ   TECHNIQUE: Contiguous axial images were acquired from the vertex through the posterior fossa without IV contrast. All CT examinations are performed with 1 or more of the following dose reduction techniques: Automated exposure control, adjustment of mA and/or kv according to patient's size, or use of iterative reconstruction techniques.   FINDINGS: No focal mass effect or midline shift is identified. The ventricles and sulci are symmetric and appropriate for the patient's age.   Minimal-mild nonspecific white matter foci, otherwise unremarkable appearance of the brain parenchyma. The gray white matter differentiation is preserved.   No acute intracranial hemorrhage is seen. No intra-axial or  "[id number]    ECG 12 lead    Result Date: 10/18/2024  Sinus tachycardia Right atrial enlargement Possible Inferior infarct , age undetermined ST & T wave abnormality, consider lateral ischemia Abnormal ECG When compared with ECG of 02-AUG-2024 14:57, Vent. rate has increased BY  42 BPM Borderline criteria for Inferior infarct are now Present Nonspecific T wave abnormality no longer evident in Anterior leads Inverted T waves have replaced nonspecific T wave abnormality in Lateral leads    ECG 12 lead    Result Date: 10/18/2024  Sinus tachycardia Right atrial enlargement Cannot rule out Inferior infarct (cited on or before 18-OCT-2024) ST & T wave abnormality, consider lateral ischemia Abnormal ECG When compared with ECG of 18-OCT-2024 13:09, (unconfirmed) Questionable change in initial forces of Inferior leads      Assessment and Plan    Mesfin Paiz is a 45 y.o. male with PMHx admitted ?multiple sclerosis, MDD, generalized anxiety, chronic pain, chronic fatigue on 10/18/2024  presents for failure to thrive and prolonged nausea/vomiting, suspected bilary colic.  Concern for undiagnosed psychiatric disorder and ability to take care of self at home. Psychiatry has determined patient is appropriate for inpatient psych placement. Pending EP[facility] placement.    ACUTE MEDICAL ISSUES:  #MDD severe with psychotic features  #Anxiety  #Passive death wish  #Social concerns   -Unable to care for self at home.  Cousin states had not heard from patient in 6 weeks and house covered in feces.    -Does not have a guardian  -Patient is refusing to eat/drink while admitted and refusing nursing and medical interventions, all of this is related to delusions he is having that \"demon flew into his neck\" in " hospital at this time  -Pending placement with EPAT    #Hypokalemia  -Repletion ordered  -Patient slowly taking oral Kcl    #Skin break down of sacrum  -Wound care consulted     #Acute urinary retention   #Dehydration  -Continue tamsulosin   -Patient has had minimal urination  -Patient also with very limited po intake  -Working to get UA and UDS per EPAT    ADDRESSED/ RESOLVED MEDICAL ISSUES:  #Dysphagia   -Patient stating that he cannot swallow  -Patient did successfully eat DQ Blizzard brought in by cousin and has successfully been drinking ginger ale   -This is due to behavorial/psych issue as patient is able to swallow  -Will increase diet to easy to chew    #Abd pain,n/v  #Biliary sludge, cholelithiasis  -Diet as tolerated  -ACS consulted - ultimately recommend lap joshua  -Bilirubin has normalized  -Will put patient on low fat diet  -Recommend eventual lap joshua as outpatient once psych issues have been appropriately addressed    #Hypomagnesemia  #Hypophosphatemia  -Resolved    CHRONIC MEDICAL ISSUES:    Fluids: Oral  Electrolytes: Replete PRN  Nutrition: Easy to chew - low fat  Antimicrobials: None  DVT ppx: Lovenox  GI ppx: None  Catheter: None  Lines: PIV  Supplemental Oxygen: None  Emergency Contact: Extended Emergency Contact Information  [place] Emergency [place] [address] [place]  Mobile Phone: [phone]  Relation: Friend  Secondary Emergency Contact: Hilary,[place] [address] [place]  Mobile Phone: [phone]  Relation: Relative   Code: DNR and No Intubation     Disposition: 45 year old male admitted for failure to thrive and N/V. Found to have gallstones and  surgery recommending lap joshua, Dolores Kyle MD  Internal Medicine, PGY- 1  10/24/24 at 11:38 AM

## 2024-10-31 ENCOUNTER — TELEPHONE (OUTPATIENT)
Age: 54
End: 2024-10-31

## 2024-10-31 NOTE — TELEPHONE ENCOUNTER
Patient called in and would like a call back from Dr. Ctoto regarding her blood pressure. Her readings are as follows-  10/31  166/108  10/27  156/109  10/16  161/104     Patient getting nervous with the numbers being so high and dis requesting a call back.     Please call Pawel at  and advise.    Thank you

## 2024-10-31 NOTE — TELEPHONE ENCOUNTER
I called pt and she is not complaining of headaches and dizziness. I offered her appointment for tomorrow and she was unable to schedule because of work but I did schedule her for Monday. Pt is aware to go to ER if she starts to have increased headaches or blurry vision.  Please advise

## 2024-11-01 DIAGNOSIS — Z00.6 ENCOUNTER FOR EXAMINATION FOR NORMAL COMPARISON OR CONTROL IN CLINICAL RESEARCH PROGRAM: ICD-10-CM

## 2024-11-01 NOTE — TELEPHONE ENCOUNTER
Is her blood pressure high other times as well or only those 3 occasions? Hard to tell off of just a few readings. I would advise her to monitor it every day 1-2 times daily and she can bring the readings with her to her appointment.

## 2024-11-04 ENCOUNTER — TELEPHONE (OUTPATIENT)
Dept: NEUROLOGY | Facility: CLINIC | Age: 54
End: 2024-11-04

## 2024-11-04 ENCOUNTER — OFFICE VISIT (OUTPATIENT)
Dept: FAMILY MEDICINE CLINIC | Facility: CLINIC | Age: 54
End: 2024-11-04
Payer: COMMERCIAL

## 2024-11-04 VITALS
TEMPERATURE: 97.4 F | OXYGEN SATURATION: 99 % | HEIGHT: 68 IN | RESPIRATION RATE: 16 BRPM | HEART RATE: 56 BPM | DIASTOLIC BLOOD PRESSURE: 96 MMHG | SYSTOLIC BLOOD PRESSURE: 158 MMHG | BODY MASS INDEX: 21.61 KG/M2 | WEIGHT: 142.6 LBS

## 2024-11-04 DIAGNOSIS — A69.23 LYME ARTHRITIS OF KNEE (HCC): ICD-10-CM

## 2024-11-04 DIAGNOSIS — I10 PRIMARY HYPERTENSION: ICD-10-CM

## 2024-11-04 DIAGNOSIS — R20.2 PARESTHESIA: ICD-10-CM

## 2024-11-04 DIAGNOSIS — H53.2 DOUBLE VISION: Primary | ICD-10-CM

## 2024-11-04 PROCEDURE — 99214 OFFICE O/P EST MOD 30 MIN: CPT | Performed by: FAMILY MEDICINE

## 2024-11-04 RX ORDER — LISINOPRIL 10 MG/1
10 TABLET ORAL DAILY
Qty: 100 TABLET | Refills: 3 | Status: SHIPPED | OUTPATIENT
Start: 2024-11-04

## 2024-11-04 NOTE — TELEPHONE ENCOUNTER
I called and spoke with patient to schedule appointment with Dr. Mcdaniel in [place] office. Patient confirmed date time and location with me. Patient will also follow provider to [place].

## 2024-11-04 NOTE — ASSESSMENT & PLAN NOTE
Pt completed doxycycline treatment in June 2024, knee pain has since resolved. Will recheck C-reactive protein. Referral provided to neurology and MRI brain and orbits w/wo contrast ordered. Will continue to monitor.   Orders:    C-reactive protein; Future

## 2024-11-05 PROBLEM — I10 PRIMARY HYPERTENSION: Status: ACTIVE | Noted: 2024-11-05

## 2024-11-05 PROBLEM — R20.2 PARESTHESIA: Status: ACTIVE | Noted: 2024-11-05

## 2024-11-05 NOTE — ASSESSMENT & PLAN NOTE
Referral to neurology provided and MRI brain and orbits w/wo contrast ordered. Will continue to monitor.   Orders:    Ambulatory referral to Neurology; Future    MRI brain and orbits wo and w contrast; Future

## 2024-11-05 NOTE — ASSESSMENT & PLAN NOTE
Not well controlled. Prescription sent for lisinopril (Zestril) 10 mg. Discussed side effects and advised patient to continue with BP monitoring log. Routine labs ordered to be completed prior to next visit. Recommended patient follow up in 1 month. Will continue to monitor.   Orders:    lisinopril (ZESTRIL) 10 mg tablet; Take 1 tablet (10 mg total) by mouth daily    CBC and differential; Future    Comprehensive metabolic panel; Future    TSH, 3rd generation with Free T4 reflex; Future

## 2024-11-05 NOTE — ASSESSMENT & PLAN NOTE
Not improving, scheduled to see opthalmology 11/11. Referral to neurology provided and MRI brain and orbits w/wo contrast ordered. Will continue to monitor.   Orders:    Ambulatory referral to Neurology; Future    MRI brain and orbits wo and w contrast; Future

## 2024-11-07 ENCOUNTER — OFFICE VISIT (OUTPATIENT)
Dept: NEUROLOGY | Facility: CLINIC | Age: 54
End: 2024-11-07
Payer: COMMERCIAL

## 2024-11-07 ENCOUNTER — APPOINTMENT (OUTPATIENT)
Dept: LAB | Facility: CLINIC | Age: 54
End: 2024-11-07
Payer: COMMERCIAL

## 2024-11-07 VITALS
SYSTOLIC BLOOD PRESSURE: 130 MMHG | HEART RATE: 60 BPM | DIASTOLIC BLOOD PRESSURE: 80 MMHG | BODY MASS INDEX: 21.59 KG/M2 | WEIGHT: 142 LBS

## 2024-11-07 DIAGNOSIS — H53.2 DOUBLE VISION: ICD-10-CM

## 2024-11-07 DIAGNOSIS — I10 PRIMARY HYPERTENSION: ICD-10-CM

## 2024-11-07 DIAGNOSIS — Z00.6 ENCOUNTER FOR EXAMINATION FOR NORMAL COMPARISON OR CONTROL IN CLINICAL RESEARCH PROGRAM: ICD-10-CM

## 2024-11-07 DIAGNOSIS — R20.2 PARESTHESIA: ICD-10-CM

## 2024-11-07 DIAGNOSIS — A69.23 LYME ARTHRITIS OF KNEE (HCC): ICD-10-CM

## 2024-11-07 LAB
EST. AVERAGE GLUCOSE BLD GHB EST-MCNC: 111 MG/DL
HBA1C MFR BLD: 5.5 %

## 2024-11-07 PROCEDURE — 86041 ACETYLCHOLN RCPTR BNDNG ANTB: CPT

## 2024-11-07 PROCEDURE — 86042 ACETYLCHOLN RCPTR BLCKG ANTB: CPT

## 2024-11-07 PROCEDURE — 86366 MUSCLE-SPECIFIC KINASE ANTB: CPT

## 2024-11-07 PROCEDURE — 86043 ACETYLCHOLN RCPTR MODLG ANTB: CPT

## 2024-11-07 PROCEDURE — 99205 OFFICE O/P NEW HI 60 MIN: CPT

## 2024-11-07 PROCEDURE — 83036 HEMOGLOBIN GLYCOSYLATED A1C: CPT

## 2024-11-07 PROCEDURE — 36415 COLL VENOUS BLD VENIPUNCTURE: CPT

## 2024-11-07 NOTE — PROGRESS NOTES
Ambulatory Visit  Name: Margaux Zaragoza      : 1970      MRN: [id number]  Encounter Provider: Carleen Mcdaniel MD  Encounter Date: 2024   Encounter department: [facility] [place]    Assessment & Plan  Double vision    Orders:    Ambulatory referral to Neurology    Acetylcholine Receptor Ab, All; Future    MUSK Antibody; Future    Hemoglobin A1C; Future      Patient is a 54 year old female with recently diagnosed HTN who presents for double vision. Started one year ago, symptoms are only presents on left extreme gaze. The double vision is diagonal.She often noticed that symptoms may be worse towards the end of the day. Denies weakness at end of the day. No difficulty rising from chairs or combing hair. No difficutly swallowing or braething. Denies any changes in voice or speech. Denies blurred vision. She was recently diagnosed with hypertension, with blood pressure as high as 160/110. She also has occasional cold/tingling sensations that encompass her entire face and head - often in times of stress. Current differential diagnosis is possible left abducens neuropathy, likely due to hypertensive disease. An MRI Brain and orbits is ordered to rule out any intracranial or brain stem lesions. I will also order Anti-ach and MUSK anti bodies to rule out myasthenia gravis given double vision worsening at the end of the day.    - Ordered Anti-Ach antibodies and anti-MUSK  - Agree with brain MRI with and without contrast   - Ordered Hgb A1C  - Counseled on the importance of blood pressure control  - Recommend ophthalmology follow up  - Follow up in 3 months      History of Present Illness   HPI    Pawel Zaragoza is a 54 year old female with recently diagnosed HTN who presents for double vision. She thinks double vision may have started 1 year ago, but she started becoming more aware of it over the past few months. She first noticed it when she was jogging and thought that the issue was  "due to her contact lenses. However, she then noticed it started happening when she wore glasses also. The double vision is diagonal and only present on left extreme gaze. The severity of double vision waxes and wanes. She often noticed that symptoms may be worse towards the end of the day. Denies weakness at end of the day. No difficulty rising from chairs or combing hair. No difficutly swallowing or braething. Denies any changes in voice or speech. Denies blurred vision.    In addition, one month ago, she began having occasional  \"weird sensations\" on her face. She says these sensations can be best described as cold/tingling. When the symptoms started, it was localized to the right cheek bone, however, nowadays the symptoms involve her entire head. Occasionally, they are worse when she in emotional or upset.  She feels the sensations once a day on average. Denies any other focal weakness or numbness. Denies loss of concoiusness, difficulty speaking. Denies numbness or itngling in her hands or feet. Desnies balance issues or falls. Denies urinary or fecal incontinence, denies saddle anesthesia. Denies any history of seizures.     She has chronic tinnitus.     She was recently diagnosed with hypertension and was prescribed lisinopril. Blood pressure has been as high as 160/110, however, she has not started lisinopril yet. Denies diabetes.     Denes history of thyroid disease.    TSH on 2/3/2024 was 1.487. Lyme IgG positive.    Social Hx:  - She works as an art therapist  - She lives with her  and daughter  - Denies tobacco use  - Denies alcohol or drug use    Review of Systems   Constitutional:  Negative for appetite change, fatigue and fever.   HENT:  Positive for tinnitus. Negative for hearing loss, trouble swallowing and voice change.         Patient states having ringing in both ears.    Eyes: Negative.  Negative for photophobia, pain and visual disturbance.        Patient states having double vision for " quite some time.    Respiratory: Negative.  Negative for shortness of breath.    Cardiovascular: Negative.  Negative for palpitations.   Gastrointestinal: Negative.  Negative for nausea and vomiting.   Endocrine: Negative.  Negative for cold intolerance.   Genitourinary: Negative.  Negative for dysuria, frequency and urgency.   Musculoskeletal:  Negative for back pain, gait problem, myalgias, neck pain and neck stiffness.   Skin: Negative.  Negative for rash.   Allergic/Immunologic: Negative.    Neurological: Negative.  Negative for dizziness, tremors, seizures, syncope, facial asymmetry, speech difficulty, weakness, light-headedness, numbness and headaches.        Patient was feeling a off sensation on the face and head.    Hematological: Negative.  Does not bruise/bleed easily.   Psychiatric/Behavioral: Negative.  Negative for confusion, hallucinations and sleep disturbance.      I have personally reviewed the MA's review of systems and made changes as necessary.      Objective     There were no vitals taken for this visit.    Physical Exam  Neurologic Exam    /80 (BP Location: Left arm, Patient Position: Sitting, Cuff Size: Large)   Pulse 60   Wt 64.4 kg (142 lb)   BMI 21.59 kg/m²      General Exam  General: well developed, no acute distress.  HEENT: mucous membranes moist, anicteric sclera.   Neck: supple, good ROM.      Neurological Exam  Mental Status: awake, alert, and fully oriented to person, place, time, and situation. Attention and memory intact. Fund of knowledge is appropriate for age and education.  There is no neglect.    Language: fluency, and comprehension normal.       Cranial Nerves: Pupils equal and reactive to light.  Visual fields full to confrontation. Extraocular motions intact with full versions, normal pursuits and saccades.  She has diagonal double vision on left extreme gaze. Facial strength full and symmetric. Facial sensation intact in V1-V3. Hearing intact to voice. Tongue  protrudes to midline. Palate elevates symmetrically. Speech clear without notable dysarthria. Shoulder shrug activation full and symmetric. Full strength of eye closure. Full strength on neck extension and flexion.     Motor: Normal bulk and tone. No pronator drift. Strength is 5/5 proximally and distally in all 4 extremities. No involuntary movements.    Sensory: Sensation intact to light touch distally in all extremities.    Coordination: Normal finger-to-nose. Normal rapid alternating movements.     Station and gait: Casual and tandem gait normal. Normal Romberg.    Reflexes: Reflexes 1+ throughout and symmetric. Both toes down going.        Results/Data:  I have reviewed the results in detail with the patient.    TSH on 2/3/2024 was 1.487        Administrative Statements   I have spent a total time of 60 minutes in caring for this patient on the day of the visit/encounter including Diagnostic results, Prognosis, Risks and benefits of tx options, Instructions for management, Patient and family education, Importance of tx compliance, Risk factor reductions, Impressions, Counseling / Coordination of care, Documenting in the medical record, and Reviewing / ordering tests, medicine, procedures  .

## 2024-11-07 NOTE — ASSESSMENT & PLAN NOTE
Orders:    Ambulatory referral to Neurology    Acetylcholine Receptor Ab, All; Future    MUSK Antibody; Future    Hemoglobin A1C; Future

## 2024-11-11 LAB — ACHR BIND AB SER-SCNC: <0.03 NMOL/L (ref 0–0.24)

## 2024-11-12 ENCOUNTER — HOSPITAL ENCOUNTER (OUTPATIENT)
Dept: RADIOLOGY | Facility: IMAGING CENTER | Age: 54
Discharge: HOME | End: 2024-11-12
Payer: COMMERCIAL

## 2024-11-12 DIAGNOSIS — R20.2 PARESTHESIA: ICD-10-CM

## 2024-11-12 DIAGNOSIS — H53.2 DOUBLE VISION: ICD-10-CM

## 2024-11-12 PROCEDURE — A9585 GADOBUTROL INJECTION: HCPCS | Performed by: FAMILY MEDICINE

## 2024-11-12 PROCEDURE — 70553 MRI BRAIN STEM W/O & W/DYE: CPT

## 2024-11-12 PROCEDURE — 70543 MRI ORBT/FAC/NCK W/O &W/DYE: CPT

## 2024-11-12 RX ORDER — GADOBUTROL 604.72 MG/ML
6 INJECTION INTRAVENOUS
Status: COMPLETED | OUTPATIENT
Start: 2024-11-12 | End: 2024-11-12

## 2024-11-12 RX ADMIN — GADOBUTROL 6 ML: 604.72 INJECTION INTRAVENOUS at 08:39

## 2024-11-13 LAB
ACHR BLOCK AB/ACHR TOTAL SFR SER: 19 % (ref 0–25)
ACHR MOD AB/ACHR TOTAL SFR SER: 1 % (ref 0–45)

## 2024-11-19 LAB
APOB+LDLR+PCSK9 GENE MUT ANL BLD/T: NOT DETECTED
BRCA1+BRCA2 DEL+DUP + FULL MUT ANL BLD/T: NOT DETECTED
MLH1+MSH2+MSH6+PMS2 GN DEL+DUP+FUL M: NOT DETECTED

## 2024-11-21 LAB — MUSK AB SER IA-ACNC: <1 U/ML

## 2024-12-02 ENCOUNTER — OFFICE VISIT (OUTPATIENT)
Dept: FAMILY MEDICINE CLINIC | Facility: CLINIC | Age: 54
End: 2024-12-02
Payer: COMMERCIAL

## 2024-12-02 VITALS
BODY MASS INDEX: 21.67 KG/M2 | HEART RATE: 57 BPM | WEIGHT: 143 LBS | TEMPERATURE: 96.3 F | RESPIRATION RATE: 16 BRPM | OXYGEN SATURATION: 99 % | HEIGHT: 68 IN | SYSTOLIC BLOOD PRESSURE: 130 MMHG | DIASTOLIC BLOOD PRESSURE: 80 MMHG

## 2024-12-02 DIAGNOSIS — I10 PRIMARY HYPERTENSION: Primary | ICD-10-CM

## 2024-12-02 DIAGNOSIS — H53.2 DOUBLE VISION: ICD-10-CM

## 2024-12-02 DIAGNOSIS — A69.23 LYME ARTHRITIS OF KNEE (HCC): ICD-10-CM

## 2024-12-02 PROCEDURE — 99214 OFFICE O/P EST MOD 30 MIN: CPT | Performed by: FAMILY MEDICINE

## 2024-12-02 NOTE — ASSESSMENT & PLAN NOTE
Symptoms are improving. MRI was normal. Patient has seen ophthalmology and neurology. Will continue to monitor symptoms. Follow up if symptoms worsening.

## 2024-12-02 NOTE — PROGRESS NOTES
Name: Margaux Zaragoza      : 1970      MRN: [id number]  Encounter Provider: Dianelys Penaloza MD  Encounter Date: 2024   Encounter department: [facility]  :  Assessment & Plan  Primary hypertension  Well-controlled on lisinopril 10mg tab daily. Continue to monitor BP at home. Will continue to monitor.   Orders:    TSH, 3rd generation with Free T4 reflex; Future    Lipid Panel with Direct LDL reflex; Future    Comprehensive metabolic panel; Future    Double vision  Symptoms are improving. MRI was normal. Patient has seen ophthalmology and neurology. Will continue to monitor symptoms. Follow up if symptoms worsening.     Lyme arthritis of knee (HCC)  Pt completed doxycycline treatment in 2024, knee pain has since resolved. Will recheck C-reactive protein.                   History of Present Illness     Patient presents to the office for 1 month follow up due to new onset of double vision and HTN. She saw neurology who believed it may be left abducens neuropathy due to HTN. They did order antibodies for myasthenia gravis which were negative and she did complete an MRI of her brain which showed no abnormalities. She saw ophthalmology and they added a prism to her glasses to help with the double vision which she states has helped. Overall she feels her symptoms have improved. She has been taking her lisinopril and reports no side effects and her BP monitoring at home has all been good. She plans to see rheumatology in March for follow up.       Review of Systems   Constitutional:  Negative for chills and fever.   HENT:  Negative for ear pain and sore throat.    Eyes:  Positive for visual disturbance. Negative for pain.   Respiratory:  Negative for cough and shortness of breath.    Cardiovascular:  Negative for chest pain and palpitations.   Gastrointestinal:  Negative for abdominal pain and vomiting.   Genitourinary:  Negative for dysuria and hematuria.   Musculoskeletal:   "Negative for arthralgias and back pain.   Skin:  Negative for color change and rash.   Neurological:  Negative for seizures and syncope.   All other systems reviewed and are negative.       Objective   /80 (BP Location: Left arm, Patient Position: Sitting, Cuff Size: Standard)   Pulse 57   Temp (!) 96.3 °F (35.7 °C) (Tympanic)   Resp 16   Ht 5' 8\" (1.727 m)   Wt 64.9 kg (143 lb)   LMP 02/14/2024 (Approximate) Comment: kinga  SpO2 99%   BMI 21.74 kg/m²      Physical Exam  Vitals and nursing note reviewed.   Constitutional:       General: She is not in acute distress.     Appearance: She is well-developed.   HENT:      Head: Normocephalic and atraumatic.   Eyes:      Conjunctiva/sclera: Conjunctivae normal.   Cardiovascular:      Rate and Rhythm: Normal rate and regular rhythm.      Heart sounds: No murmur heard.  Pulmonary:      Effort: Pulmonary effort is normal. No respiratory distress.      Breath sounds: Normal breath sounds.   Musculoskeletal:         General: No swelling.   Skin:     General: Skin is warm and dry.   Neurological:      Mental Status: She is alert.   Psychiatric:         Mood and Affect: Mood normal.       "

## 2024-12-02 NOTE — ASSESSMENT & PLAN NOTE
Well-controlled on lisinopril 10mg tab daily. Continue to monitor BP at home. Will continue to monitor.   Orders:    TSH, 3rd generation with Free T4 reflex; Future    Lipid Panel with Direct LDL reflex; Future    Comprehensive metabolic panel; Future

## 2024-12-02 NOTE — ASSESSMENT & PLAN NOTE
Pt completed doxycycline treatment in June 2024, knee pain has since resolved. Will recheck C-reactive protein.

## 2024-12-27 ENCOUNTER — TELEPHONE (OUTPATIENT)
Age: 54
End: 2024-12-27

## 2024-12-27 NOTE — TELEPHONE ENCOUNTER
I called patient Zee that her appointment has been schedule for F/U with Dr. Mcdaniel in [place] on 2/06/2025 at 1:00pm.

## 2025-02-06 ENCOUNTER — OFFICE VISIT (OUTPATIENT)
Age: 55
End: 2025-02-06
Payer: COMMERCIAL

## 2025-02-06 VITALS
SYSTOLIC BLOOD PRESSURE: 124 MMHG | HEART RATE: 54 BPM | HEIGHT: 68 IN | DIASTOLIC BLOOD PRESSURE: 92 MMHG | BODY MASS INDEX: 22.58 KG/M2 | WEIGHT: 149 LBS

## 2025-02-06 DIAGNOSIS — H53.2 DOUBLE VISION: Primary | ICD-10-CM

## 2025-02-06 PROCEDURE — 99213 OFFICE O/P EST LOW 20 MIN: CPT

## 2025-02-06 NOTE — PROGRESS NOTES
Name: Margaux Zaragoza      : 1970      MRN: [id number]  Encounter Provider: Carleen Mcdaniel MD  Encounter Date: 2025   Encounter department: [facility]  :  Assessment & Plan  Double vision         Patient is a 54 year old female with recently diagnosed HTN who presents as follow up for double vision. She was last seen 2024 - told to get MRI Brain with and without contrast and labs: Hgb A1C, Anti-Ach, Anti-MUSK, and ophthalmology follow up. During last visit, she had diagonal double vision on left extreme gaze. It was thought that she may have had left abducens neuropathy secondary to hypertension.     Today, she feels her double vision is much better - it is almost 90% better than her last visit. She saw ophthalmology and Frensel lens was applied to the right eye. She also does not have any episodes of facial tingling. No double vision on exam today.    Hgb A1c 5.5, MUSK antibody negative, Ach blinding, blocking, and modulating negative. MRI Brain and orbits is normal.     Plan:  - Blood pressure control per PCP  - Follow up as needed                    History of Present Illness   HPI    Patient is a 54 year old female with recently diagnosed HTN who presents as follow up for double vision. She was last seen 2024 - told to get MRI Brain with and without contrast and labs: Hgb A1C, Anti-Ach, Anti-MUSK, and ophthalmology follow up.    Hgb A1c 5.5, MUSK antibody negative, Ach blinding, blocking, and modulating negative. MRI Brain and orbits is normal.   She saw ophthalmology and Frensel lens was applied to the right eye.    Today, she feels her double vision is much better - it is almost 90% better than her last visit. She also does not have any episodes of facial tingling.     Note from 2024:  Pawel Zaragoza is a 54 year old female with recently diagnosed HTN who presents for double vision. She thinks double vision may have started 1 year ago, but she  "started becoming more aware of it over the past few months. She first noticed it when she was jogging and thought that the issue was due to her contact lenses. However, she then noticed it started happening when she wore glasses also. The double vision is diagonal and only present on left extreme gaze. The severity of double vision waxes and wanes. She often noticed that symptoms may be worse towards the end of the day. Denies weakness at end of the day. No difficulty rising from chairs or combing hair. No difficutly swallowing or braething. Denies any changes in voice or speech. Denies blurred vision.     In addition, one month ago, she began having occasional  \"weird sensations\" on her face. She says these sensations can be best described as cold/tingling. When the symptoms started, it was localized to the right cheek bone, however, nowadays the symptoms involve her entire head. Occasionally, they are worse when she in emotional or upset.  She feels the sensations once a day on average. Denies any other focal weakness or numbness. Denies loss of concoiusness, difficulty speaking. Denies numbness or itngling in her hands or feet. Desnies balance issues or falls. Denies urinary or fecal incontinence, denies saddle anesthesia. Denies any history of seizures.      She has chronic tinnitus.      She was recently diagnosed with hypertension and was prescribed lisinopril. Blood pressure has been as high as 160/110, however, she has not started lisinopril yet. Denies diabetes.      Denes history of thyroid disease.     TSH on 2/3/2024 was 1.487. Lyme IgG positive.     Social Hx:  - She works as an art therapist  - She lives with her  and daughter  - Denies tobacco use  - Denies alcohol or drug use       Review of Systems   Constitutional:  Negative for appetite change, fatigue and fever.   HENT: Negative.  Negative for hearing loss, tinnitus, trouble swallowing and voice change.    Eyes:  Positive for visual " "disturbance (flashing lights, Double vision). Negative for photophobia and pain.   Respiratory: Negative.  Negative for shortness of breath.    Cardiovascular: Negative.  Negative for palpitations.   Gastrointestinal: Negative.  Negative for nausea and vomiting.   Endocrine: Negative.  Negative for cold intolerance.   Genitourinary: Negative.  Negative for dysuria, frequency and urgency.   Musculoskeletal:  Negative for back pain, gait problem, myalgias, neck pain and neck stiffness.   Skin: Negative.  Negative for rash.   Allergic/Immunologic: Negative.    Neurological:  Positive for numbness (facial numbness). Negative for dizziness, tremors, seizures, syncope, facial asymmetry, speech difficulty, weakness, light-headedness and headaches.   Hematological: Negative.  Does not bruise/bleed easily.   Psychiatric/Behavioral: Negative.  Negative for confusion, hallucinations and sleep disturbance.     I have personally reviewed the MA's review of systems and made changes as necessary.         Objective   There were no vitals taken for this visit.    Physical Exam  Neurological Exam    /92 (BP Location: Left arm, Patient Position: Sitting, Cuff Size: Large)   Pulse (!) 54   Ht 5' 8\" (1.727 m)   Wt 67.6 kg (149 lb)   BMI 22.66 kg/m²      General Exam  General: well developed, no acute distress.  HEENT: mucous membranes moist, anicteric sclera.   Neck: supple, good ROM.      Neurological Exam  Mental Status: awake, alert, and fully oriented to person, place, time, and situation. Attention and memory intact. Fund of knowledge is appropriate for age and education.  There is no neglect.    Language: fluency, and comprehension normal.       Cranial Nerves: Pupils equal and reactive to light.  Visual fields full to confrontation. Extraocular motions intact with full versions, normal pursuits and saccades. Facial strength full and symmetric. Facial sensation intact in V1-V3. Hearing intact to voice. Tongue protrudes " to midline. Palate elevates symmetrically. Speech clear without notable dysarthria. Shoulder shrug activation full and symmetric.    Motor: Normal bulk and tone. No pronator drift. Strength is 5/5 proximally and distally in all 4 extremities. No involuntary movements.    Sensory: Sensation intact to light touch distally in all extremities.    Coordination: Normal finger-to-nose. Normal rapid alternating movements.     Station and gait: Casual and tandem gait normal. Normal Romberg.    Reflexes: Reflexes 2+ throughout and symmetric.     Radiology Results Review: I have reviewed radiology reports from   including: MRI brain.  MRI OF THE BRAIN AND ORBITS - WITH AND WITHOUT CONTRAST     INDICATION: H53.2: Diplopia  R20.2: Paresthesia of skin. Headaches and paresthesias of the face bilaterally. Diplopia.     COMPARISON:  None.     TECHNIQUE:  Multiplanar, multisequence imaging of the brain and orbits was performed before and after gadolinium administration.  Imaging performed on 1.5T MRI     IV Contrast:  6 mL of Gadobutrol injection (SINGLE-DOSE)     IMAGE QUALITY:  Diagnostic.     FINDINGS:     BRAIN PARENCHYMA:  There is no discrete mass, mass effect or midline shift.  Brainstem and cerebellum demonstrate normal signal. There is no intracranial hemorrhage.  There is no evidence of acute infarction and diffusion imaging is unremarkable.  There   are no white matter changes in the cerebral hemispheres. Normal postcontrast imaging.     ORBITS:  Normal globes.  Normal ocular muscles.  Optic nerves and chiasm are normal.  Normal cavernous sinuses.  Preseptal and retrobulbar soft tissues are normal.     VENTRICLES:  Normal for the patient's age.     SELLA AND PITUITARY GLAND:  Normal     PARANASAL SINUSES: Mucosal thickening in the inferior portion of the bilateral maxillary sinuses.     VASCULATURE:  Evaluation of the major intracranial vasculature demonstrates appropriate flow voids.     CALVARIUM AND SKULL BASE:   Normal.     EXTRACRANIAL SOFT TISSUES:  Normal.     IMPRESSION:     No acute infarction, edema, or pathologic intra-axial enhancement.     The globes, extraocular muscles, and optic nerve sheath complexes appear within normal limits.     Mild sinus mucosal disease.          Administrative Statements   I have spent a total time of 30 minutes in caring for this patient on the day of the visit/encounter including Diagnostic results, Prognosis, Risks and benefits of tx options, Instructions for management, Patient and family education, Risk factor reductions, Counseling / Coordination of care, Reviewing / ordering tests, medicine, procedures  , and Obtaining or reviewing history  .

## 2025-02-06 NOTE — ASSESSMENT & PLAN NOTE
Patient is a 54 year old female with recently diagnosed HTN who presents as follow up for double vision. She was last seen 11/7/2024 - told to get MRI Brain with and without contrast and labs: Hgb A1C, Anti-Ach, Anti-MUSK, and ophthalmology follow up. During last visit, she had diagonal double vision on left extreme gaze. It was thought that she may have had left abducens neuropathy secondary to hypertension.     Today, she feels her double vision is much better - it is almost 90% better than her last visit. She saw ophthalmology and Frensel lens was applied to the right eye. She also does not have any episodes of facial tingling. No double vision on exam today.    Hgb A1c 5.5, MUSK antibody negative, Ach blinding, blocking, and modulating negative. MRI Brain and orbits is normal.     Plan:  - Blood pressure control per PCP  - Follow up as needed

## 2025-03-05 ENCOUNTER — APPOINTMENT (OUTPATIENT)
Dept: LAB | Facility: CLINIC | Age: 55
End: 2025-03-05
Payer: COMMERCIAL

## 2025-03-05 ENCOUNTER — OFFICE VISIT (OUTPATIENT)
Dept: RHEUMATOLOGY | Facility: CLINIC | Age: 55
End: 2025-03-05
Payer: COMMERCIAL

## 2025-03-05 VITALS
DIASTOLIC BLOOD PRESSURE: 70 MMHG | SYSTOLIC BLOOD PRESSURE: 126 MMHG | HEIGHT: 68 IN | WEIGHT: 145 LBS | BODY MASS INDEX: 21.98 KG/M2

## 2025-03-05 DIAGNOSIS — M25.462 PAIN AND SWELLING OF LEFT KNEE: ICD-10-CM

## 2025-03-05 DIAGNOSIS — M25.562 PAIN AND SWELLING OF LEFT KNEE: ICD-10-CM

## 2025-03-05 DIAGNOSIS — M02.362 REACTIVE ARTHRITIS OF LEFT KNEE (HCC): Primary | ICD-10-CM

## 2025-03-05 LAB
CRP SERPL QL: 1.4 MG/L
ERYTHROCYTE [SEDIMENTATION RATE] IN BLOOD: 11 MM/HOUR (ref 0–29)

## 2025-03-05 PROCEDURE — 85652 RBC SED RATE AUTOMATED: CPT | Performed by: INTERNAL MEDICINE

## 2025-03-05 PROCEDURE — 99214 OFFICE O/P EST MOD 30 MIN: CPT | Performed by: INTERNAL MEDICINE

## 2025-03-05 PROCEDURE — 36415 COLL VENOUS BLD VENIPUNCTURE: CPT | Performed by: INTERNAL MEDICINE

## 2025-03-05 PROCEDURE — 86140 C-REACTIVE PROTEIN: CPT | Performed by: INTERNAL MEDICINE

## 2025-03-05 NOTE — PROGRESS NOTES
Name: Margaux Zaragoza      : 1970      MRN: [id number]  Encounter Provider: Haley Melara MD  Encounter Date: 3/5/2025   Encounter department: [facility]  :  Assessment & Plan  Reactive arthritis of left knee (HCC)  Now resolved. The condition is likely a result of previous exposure to Lyme disease, which may have triggered her immune system, leading to a swollen knee. This is a common manifestation of reactive arthritis, typically affecting one or two joints rather than multiple or symmetric joints as seen in rheumatoid arthritis. Her last blood work, conducted during a flare-up, revealed elevated inflammatory markers. Given that she has not experienced any flare-ups in the past 8 months, no further treatment is deemed necessary at this time. Inflammatory markers obtained today have normalized. She has been provided with official information about reactive arthritis. If she experiences multiple flare-ups within a year, the possibility of initiating long-term controller medication will be considered.  Orders:    Sedimentation rate, automated    C-reactive protein    Pain and swelling of left knee  resolved       Do labs; returned unremarkable    RTC as needed        Pertinent Medical History        History of Present Illness   Margaux Zaragoza is a 54 y.o. female who presents for follow-up. .  History of Present Illness  The patient is a 54-year-old female who presents for a follow-up of her reactive arthritis.    She received a steroid injection in her left knee during her last visit, which she reports as being highly effective. She abstained from running for the remainder of  but maintained her fitness through non-impact activities such as water jogging three times a week, cycling, and using the elliptical machine. She resumed her training regimen on 2025, gradually increasing her intensity and recently completed a half marathon. She reports no current  issues with her knees and has not experienced any flare-ups since her last visit. She completed a 4-week course of doxycycline and was prescribed naproxen for use as needed during flare-ups. She also received a prednisone dose pack on 06/30/2024, which resolved her symptoms. She has not required naproxen since completing the steroid course. She reports no issues with her hands or any current tingling sensations. She was initially hospitalized due to her knee symptoms, at which time the Lyme titer was the only positive finding.    She experienced double vision in the fall, which was attributed to Lyme disease by her ophthalmologist. The double vision has been corrected with a prism on her lens, and she has been given a prescription for it to be ground into her lens. She continues to experience double vision, but it has improved.    In the fall, she was referred to a neurologist due to high blood pressure, facial tingling, and rapid heartbeat, all of which occurred simultaneously. These symptoms were attributed to Lyme disease. She believes the facial tingling was a symptom of high blood pressure. She is currently on lisinopril for blood pressure management and reports that her condition has improved since starting this medication.    Supplemental Information  She had reconstructive surgery on her right knee years ago.    MEDICATIONS  Discontinued: Doxycycline, naproxen, prednisone.  Current: Lisinopril.    PROCEDURE  The patient received a steroid injection in her left knee during her last visit, which she reports as being highly effective.     Review of Systems  See HPI   Answers submitted by the patient for this visit:  Lower Extremity Injury Questionnaire (Submitted on 3/3/2025)  Chief Complaint: Lower extremity pain  Incident occurred: less than 1 hour ago  Pain location: left knee  Pain - numeric: 0/10  tingling: No  inability to bear weight: No  loss of motion: No  loss of sensation: No  muscle weakness:  "No  Foreign body present: no foreign bodies  Aggravated by: nothing     Objective   /70   Ht 5' 8\" (1.727 m)   Wt 65.8 kg (145 lb)   BMI 22.05 kg/m²     Physical Exam  Lungs were auscultated.  Heart was examined.  Physical Exam  Constitutional:       General: She is not in acute distress.  HENT:      Head: Normocephalic and atraumatic.   Eyes:      Conjunctiva/sclera: Conjunctivae normal.   Cardiovascular:      Rate and Rhythm: Normal rate and regular rhythm.      Heart sounds: S1 normal and S2 normal.      No friction rub.   Pulmonary:      Effort: Pulmonary effort is normal. No respiratory distress.      Breath sounds: Normal breath sounds. No wheezing, rhonchi or rales.   Musculoskeletal:      Cervical back: Neck supple.   Skin:     Coloration: Skin is not pale.   Neurological:      Mental Status: She is alert. Mental status is at baseline.   Psychiatric:         Mood and Affect: Mood normal.         Behavior: Behavior normal.       Results  Laboratory Studies  Lyme titer showed past infection. Inflammatory markers were elevated during a reactive arthritis flare, but normalized now.  Recent labs:  Lab Results   Component Value Date/Time    SODIUM 139 06/18/2024 11:16 PM    K 3.9 06/18/2024 11:16 PM    BUN 20 06/18/2024 11:16 PM    CREATININE 0.96 06/18/2024 11:16 PM    GLUC 96 06/18/2024 11:16 PM    CALCIUM 8.8 06/18/2024 11:16 PM    AST 17 06/18/2024 11:16 PM    ALT 8 06/18/2024 11:16 PM    ALB 3.5 06/18/2024 11:16 PM    TP 6.3 (L) 06/18/2024 11:16 PM    EGFR 68 06/18/2024 11:16 PM     Lab Results   Component Value Date/Time    HGB 11.6 06/18/2024 11:16 PM    WBC 5.04 06/18/2024 11:16 PM     06/18/2024 11:16 PM     Lab Results   Component Value Date/Time    QHT3ZBCENEGM 1.487 02/03/2024 08:45 AM               "

## 2025-05-29 ENCOUNTER — APPOINTMENT (OUTPATIENT)
Dept: LAB | Age: 55
End: 2025-05-29
Attending: FAMILY MEDICINE
Payer: COMMERCIAL

## 2025-05-29 DIAGNOSIS — I10 PRIMARY HYPERTENSION: ICD-10-CM

## 2025-05-29 LAB
ALBUMIN SERPL BCG-MCNC: 4.3 G/DL (ref 3.5–5)
ALP SERPL-CCNC: 68 U/L (ref 34–104)
ALT SERPL W P-5'-P-CCNC: 14 U/L (ref 7–52)
ANION GAP SERPL CALCULATED.3IONS-SCNC: 4 MMOL/L (ref 4–13)
AST SERPL W P-5'-P-CCNC: 21 U/L (ref 13–39)
BASOPHILS # BLD AUTO: 0.03 THOUSANDS/ÂΜL (ref 0–0.1)
BASOPHILS NFR BLD AUTO: 1 % (ref 0–1)
BILIRUB SERPL-MCNC: 0.48 MG/DL (ref 0.2–1)
BUN SERPL-MCNC: 17 MG/DL (ref 5–25)
CALCIUM SERPL-MCNC: 9.5 MG/DL (ref 8.4–10.2)
CHLORIDE SERPL-SCNC: 105 MMOL/L (ref 96–108)
CHOLEST SERPL-MCNC: 193 MG/DL (ref ?–200)
CO2 SERPL-SCNC: 33 MMOL/L (ref 21–32)
CREAT SERPL-MCNC: 0.85 MG/DL (ref 0.6–1.3)
CRP SERPL QL: <1 MG/L
EOSINOPHIL # BLD AUTO: 0.19 THOUSAND/ÂΜL (ref 0–0.61)
EOSINOPHIL NFR BLD AUTO: 6 % (ref 0–6)
ERYTHROCYTE [DISTWIDTH] IN BLOOD BY AUTOMATED COUNT: 12.9 % (ref 11.6–15.1)
GFR SERPL CREATININE-BSD FRML MDRD: 78 ML/MIN/1.73SQ M
GLUCOSE P FAST SERPL-MCNC: 81 MG/DL (ref 65–99)
HCT VFR BLD AUTO: 39.1 % (ref 34.8–46.1)
HDLC SERPL-MCNC: 86 MG/DL
HGB BLD-MCNC: 12.3 G/DL (ref 11.5–15.4)
IMM GRANULOCYTES # BLD AUTO: 0.01 THOUSAND/UL (ref 0–0.2)
IMM GRANULOCYTES NFR BLD AUTO: 0 % (ref 0–2)
LDLC SERPL CALC-MCNC: 97 MG/DL (ref 0–100)
LYMPHOCYTES # BLD AUTO: 1.11 THOUSANDS/ÂΜL (ref 0.6–4.47)
LYMPHOCYTES NFR BLD AUTO: 34 % (ref 14–44)
MCH RBC QN AUTO: 30.6 PG (ref 26.8–34.3)
MCHC RBC AUTO-ENTMCNC: 31.5 G/DL (ref 31.4–37.4)
MCV RBC AUTO: 97 FL (ref 82–98)
MONOCYTES # BLD AUTO: 0.23 THOUSAND/ÂΜL (ref 0.17–1.22)
MONOCYTES NFR BLD AUTO: 7 % (ref 4–12)
NEUTROPHILS # BLD AUTO: 1.74 THOUSANDS/ÂΜL (ref 1.85–7.62)
NEUTS SEG NFR BLD AUTO: 52 % (ref 43–75)
NRBC BLD AUTO-RTO: 0 /100 WBCS
PLATELET # BLD AUTO: 266 THOUSANDS/UL (ref 149–390)
PMV BLD AUTO: 10.6 FL (ref 8.9–12.7)
POTASSIUM SERPL-SCNC: 4.2 MMOL/L (ref 3.5–5.3)
PROT SERPL-MCNC: 6.9 G/DL (ref 6.4–8.4)
RBC # BLD AUTO: 4.02 MILLION/UL (ref 3.81–5.12)
SODIUM SERPL-SCNC: 142 MMOL/L (ref 135–147)
TRIGL SERPL-MCNC: 48 MG/DL (ref ?–150)
TSH SERPL DL<=0.05 MIU/L-ACNC: 1.48 UIU/ML (ref 0.45–4.5)
WBC # BLD AUTO: 3.31 THOUSAND/UL (ref 4.31–10.16)

## 2025-05-29 PROCEDURE — 80061 LIPID PANEL: CPT

## 2025-06-02 ENCOUNTER — OFFICE VISIT (OUTPATIENT)
Dept: FAMILY MEDICINE CLINIC | Facility: CLINIC | Age: 55
End: 2025-06-02
Payer: COMMERCIAL

## 2025-06-02 VITALS
TEMPERATURE: 97.4 F | HEART RATE: 57 BPM | BODY MASS INDEX: 21.67 KG/M2 | HEIGHT: 68 IN | RESPIRATION RATE: 16 BRPM | WEIGHT: 143 LBS | DIASTOLIC BLOOD PRESSURE: 82 MMHG | OXYGEN SATURATION: 99 % | SYSTOLIC BLOOD PRESSURE: 126 MMHG

## 2025-06-02 DIAGNOSIS — Z12.31 ENCOUNTER FOR SCREENING MAMMOGRAM FOR BREAST CANCER: ICD-10-CM

## 2025-06-02 DIAGNOSIS — I10 PRIMARY HYPERTENSION: Primary | ICD-10-CM

## 2025-06-02 DIAGNOSIS — Z00.00 HEALTHCARE MAINTENANCE: ICD-10-CM

## 2025-06-02 DIAGNOSIS — Z23 ENCOUNTER FOR IMMUNIZATION: ICD-10-CM

## 2025-06-02 PROCEDURE — 99396 PREV VISIT EST AGE 40-64: CPT | Performed by: FAMILY MEDICINE

## 2025-06-02 PROCEDURE — 90471 IMMUNIZATION ADMIN: CPT

## 2025-06-02 PROCEDURE — 90750 HZV VACC RECOMBINANT IM: CPT

## 2025-06-02 PROCEDURE — 99214 OFFICE O/P EST MOD 30 MIN: CPT | Performed by: FAMILY MEDICINE

## 2025-06-02 NOTE — ASSESSMENT & PLAN NOTE
BP = 126/82  Continue lisinopril as prescribed.  Orders:  •  CBC and differential; Future  •  Comprehensive metabolic panel; Future  •  Lipid Panel with Direct LDL reflex; Future  •  TSH, 3rd generation with Free T4 reflex; Future

## 2025-06-02 NOTE — PROGRESS NOTES
Name: Margaux Zaragoza      : 1970      MRN: [id number]  Encounter Provider: Dianelys Penaloza MD  Encounter Date: 2025   Encounter department: [facility]  :  Assessment & Plan  Primary hypertension  BP = 126/82  Continue lisinopril as prescribed.  Orders:  •  CBC and differential; Future  •  Comprehensive metabolic panel; Future  •  Lipid Panel with Direct LDL reflex; Future  •  TSH, 3rd generation with Free T4 reflex; Future    Encounter for immunization    Orders:  •  Zoster Vaccine Recombinant IM    Encounter for screening mammogram for breast cancer  Patient has no personal or family history of breast cancer. Due for yearly mammogram.  Orders:  •  Mammo screening bilateral w 3d and cad; Future    Healthcare maintenance  It was discussed about immunizations, diet, exercise and safety measures.  She was given referral to get her colonoscopy.  Order mammogram scheduled tomorrow.  Was discussed with patient she is to see her gynecologist.              History of Present Illness   Patient is a 54 year old female with  significant PMHX of HTN presenting to the office for a well visit, last seen 6 months ago. Patient presents today with no acute complaints. She states she has been dealing with some diplopia for a while now that has gotten better. She has difficulties with contact lenses and has to wear glasses. She is following regularly with an eye doctor. Patient states she tried to stop her blood pressure medication and increase her grapefruit intake but her blood pressure was not as well controlled. She reinitiated her lisinopril and her BP has been well controlled.       Review of Systems   Constitutional:  Negative for activity change and fatigue.   Eyes:  Positive for visual disturbance.   Respiratory:  Negative for chest tightness and shortness of breath.    Cardiovascular:  Negative for chest pain, palpitations and leg swelling.       Objective   /82 (BP Location:  "Left arm, Patient Position: Sitting, Cuff Size: Adult)   Pulse 57   Temp (!) 97.4 °F (36.3 °C) (Tympanic)   Resp 16   Ht 5' 8\" (1.727 m)   Wt 64.9 kg (143 lb)   SpO2 99%   BMI 21.74 kg/m²      Physical Exam  Vitals and nursing note reviewed.   Constitutional:       General: She is not in acute distress.     Appearance: Normal appearance. She is not ill-appearing.   HENT:      Head: Normocephalic and atraumatic.      Right Ear: Tympanic membrane, ear canal and external ear normal.      Left Ear: Tympanic membrane, ear canal and external ear normal.      Nose: Nose normal.      Mouth/Throat:      Mouth: Mucous membranes are moist.      Pharynx: Oropharynx is clear.     Cardiovascular:      Rate and Rhythm: Normal rate and regular rhythm.      Heart sounds: No murmur heard.     No friction rub. No gallop.   Pulmonary:      Effort: Pulmonary effort is normal. No respiratory distress.      Breath sounds: Normal breath sounds. No wheezing, rhonchi or rales.     Skin:     General: Skin is warm and dry.     Neurological:      Mental Status: She is alert and oriented to person, place, and time.         "

## 2025-06-05 PROBLEM — Z23 ENCOUNTER FOR IMMUNIZATION: Status: ACTIVE | Noted: 2025-06-05

## 2025-06-05 NOTE — ASSESSMENT & PLAN NOTE
It was discussed about immunizations, diet, exercise and safety measures.  She was given referral to get her colonoscopy.  Order mammogram scheduled tomorrow.  Was discussed with patient she is to see her gynecologist.

## 2025-06-30 ENCOUNTER — HOSPITAL ENCOUNTER (OUTPATIENT)
Dept: RADIOLOGY | Facility: IMAGING CENTER | Age: 55
Discharge: HOME | End: 2025-06-30
Attending: FAMILY MEDICINE
Payer: COMMERCIAL

## 2025-06-30 VITALS — WEIGHT: 143 LBS | HEIGHT: 68 IN | BODY MASS INDEX: 21.67 KG/M2

## 2025-06-30 DIAGNOSIS — Z12.31 ENCOUNTER FOR SCREENING MAMMOGRAM FOR BREAST CANCER: ICD-10-CM

## 2025-06-30 PROCEDURE — 77067 SCR MAMMO BI INCL CAD: CPT

## 2025-06-30 PROCEDURE — 77063 BREAST TOMOSYNTHESIS BI: CPT

## 2025-07-05 PROBLEM — Z00.00 HEALTHCARE MAINTENANCE: Status: RESOLVED | Noted: 2020-08-11 | Resolved: 2025-07-05
